# Patient Record
Sex: FEMALE | Race: WHITE | NOT HISPANIC OR LATINO
[De-identification: names, ages, dates, MRNs, and addresses within clinical notes are randomized per-mention and may not be internally consistent; named-entity substitution may affect disease eponyms.]

---

## 2017-01-23 PROBLEM — Z00.00 ENCOUNTER FOR PREVENTIVE HEALTH EXAMINATION: Status: ACTIVE | Noted: 2017-01-23

## 2017-02-24 ENCOUNTER — APPOINTMENT (OUTPATIENT)
Dept: CARDIOLOGY | Facility: CLINIC | Age: 72
End: 2017-02-24

## 2017-07-03 ENCOUNTER — APPOINTMENT (OUTPATIENT)
Dept: CARDIOLOGY | Facility: CLINIC | Age: 72
End: 2017-07-03

## 2017-07-10 ENCOUNTER — APPOINTMENT (OUTPATIENT)
Dept: CARDIOLOGY | Facility: CLINIC | Age: 72
End: 2017-07-10
Payer: COMMERCIAL

## 2017-07-10 PROCEDURE — 85610 PROTHROMBIN TIME: CPT | Mod: QW

## 2017-07-10 PROCEDURE — 99214 OFFICE O/P EST MOD 30 MIN: CPT

## 2017-07-10 PROCEDURE — 93306 TTE W/DOPPLER COMPLETE: CPT

## 2017-07-12 PROCEDURE — 93224 XTRNL ECG REC UP TO 48 HRS: CPT

## 2017-07-18 ENCOUNTER — APPOINTMENT (OUTPATIENT)
Dept: CARDIOLOGY | Facility: CLINIC | Age: 72
End: 2017-07-18
Payer: COMMERCIAL

## 2017-07-18 PROCEDURE — 99214 OFFICE O/P EST MOD 30 MIN: CPT

## 2017-08-15 ENCOUNTER — APPOINTMENT (OUTPATIENT)
Dept: CARDIOLOGY | Facility: CLINIC | Age: 72
End: 2017-08-15
Payer: COMMERCIAL

## 2017-08-15 PROCEDURE — 85610 PROTHROMBIN TIME: CPT | Mod: QW

## 2017-08-15 PROCEDURE — 99214 OFFICE O/P EST MOD 30 MIN: CPT

## 2017-08-21 ENCOUNTER — APPOINTMENT (OUTPATIENT)
Dept: CARDIOLOGY | Facility: CLINIC | Age: 72
End: 2017-08-21
Payer: COMMERCIAL

## 2017-08-21 PROCEDURE — 85610 PROTHROMBIN TIME: CPT | Mod: QW

## 2017-08-25 ENCOUNTER — APPOINTMENT (OUTPATIENT)
Dept: CARDIOLOGY | Facility: CLINIC | Age: 72
End: 2017-08-25
Payer: COMMERCIAL

## 2017-08-25 PROCEDURE — 85610 PROTHROMBIN TIME: CPT | Mod: QW

## 2017-09-01 ENCOUNTER — APPOINTMENT (OUTPATIENT)
Dept: CARDIOLOGY | Facility: CLINIC | Age: 72
End: 2017-09-01
Payer: COMMERCIAL

## 2017-09-01 PROCEDURE — 85610 PROTHROMBIN TIME: CPT | Mod: QW

## 2017-09-08 ENCOUNTER — APPOINTMENT (OUTPATIENT)
Dept: CARDIOLOGY | Facility: CLINIC | Age: 72
End: 2017-09-08
Payer: MEDICARE

## 2017-09-08 PROCEDURE — 85610 PROTHROMBIN TIME: CPT | Mod: QW

## 2017-09-15 ENCOUNTER — APPOINTMENT (OUTPATIENT)
Dept: CARDIOLOGY | Facility: CLINIC | Age: 72
End: 2017-09-15
Payer: MEDICARE

## 2017-09-15 PROCEDURE — 85610 PROTHROMBIN TIME: CPT | Mod: QW

## 2017-09-22 ENCOUNTER — APPOINTMENT (OUTPATIENT)
Dept: CARDIOLOGY | Facility: CLINIC | Age: 72
End: 2017-09-22
Payer: MEDICARE

## 2017-09-22 PROCEDURE — 85610 PROTHROMBIN TIME: CPT | Mod: QW

## 2017-09-29 ENCOUNTER — APPOINTMENT (OUTPATIENT)
Dept: CARDIOLOGY | Facility: CLINIC | Age: 72
End: 2017-09-29
Payer: MEDICARE

## 2017-09-29 PROCEDURE — 85610 PROTHROMBIN TIME: CPT | Mod: QW

## 2017-10-09 ENCOUNTER — APPOINTMENT (OUTPATIENT)
Dept: CARDIOLOGY | Facility: CLINIC | Age: 72
End: 2017-10-09
Payer: COMMERCIAL

## 2017-10-09 PROCEDURE — 85610 PROTHROMBIN TIME: CPT | Mod: QW

## 2017-10-19 ENCOUNTER — APPOINTMENT (OUTPATIENT)
Dept: CARDIOLOGY | Facility: CLINIC | Age: 72
End: 2017-10-19
Payer: COMMERCIAL

## 2017-10-19 PROCEDURE — 99214 OFFICE O/P EST MOD 30 MIN: CPT | Mod: 25

## 2017-10-19 PROCEDURE — 93291 INTERROG DEV EVAL SCRMS IP: CPT

## 2017-10-20 ENCOUNTER — APPOINTMENT (OUTPATIENT)
Dept: CARDIOLOGY | Facility: CLINIC | Age: 72
End: 2017-10-20
Payer: COMMERCIAL

## 2017-10-20 PROCEDURE — 85610 PROTHROMBIN TIME: CPT | Mod: QW

## 2017-11-03 ENCOUNTER — APPOINTMENT (OUTPATIENT)
Dept: CARDIOLOGY | Facility: CLINIC | Age: 72
End: 2017-11-03

## 2017-11-03 ENCOUNTER — APPOINTMENT (OUTPATIENT)
Dept: CARDIOLOGY | Facility: CLINIC | Age: 72
End: 2017-11-03
Payer: COMMERCIAL

## 2017-11-03 PROCEDURE — 85610 PROTHROMBIN TIME: CPT | Mod: QW

## 2017-11-08 ENCOUNTER — RECORD ABSTRACTING (OUTPATIENT)
Age: 72
End: 2017-11-08

## 2017-11-08 DIAGNOSIS — Z86.19 PERSONAL HISTORY OF OTHER INFECTIOUS AND PARASITIC DISEASES: ICD-10-CM

## 2017-11-08 DIAGNOSIS — Z87.19 PERSONAL HISTORY OF OTHER DISEASES OF THE DIGESTIVE SYSTEM: ICD-10-CM

## 2017-11-08 DIAGNOSIS — Z87.898 PERSONAL HISTORY OF OTHER SPECIFIED CONDITIONS: ICD-10-CM

## 2017-11-08 DIAGNOSIS — Z87.39 PERSONAL HISTORY OF OTHER DISEASES OF THE MUSCULOSKELETAL SYSTEM AND CONNECTIVE TISSUE: ICD-10-CM

## 2017-11-08 DIAGNOSIS — Z86.39 PERSONAL HISTORY OF OTHER ENDOCRINE, NUTRITIONAL AND METABOLIC DISEASE: ICD-10-CM

## 2017-11-08 DIAGNOSIS — Z82.49 FAMILY HISTORY OF ISCHEMIC HEART DISEASE AND OTHER DISEASES OF THE CIRCULATORY SYSTEM: ICD-10-CM

## 2017-11-08 DIAGNOSIS — Z78.9 OTHER SPECIFIED HEALTH STATUS: ICD-10-CM

## 2017-11-09 ENCOUNTER — APPOINTMENT (OUTPATIENT)
Dept: CARDIOLOGY | Facility: CLINIC | Age: 72
End: 2017-11-09
Payer: COMMERCIAL

## 2017-11-09 VITALS
WEIGHT: 146 LBS | OXYGEN SATURATION: 96 % | SYSTOLIC BLOOD PRESSURE: 130 MMHG | BODY MASS INDEX: 24.32 KG/M2 | HEART RATE: 82 BPM | HEIGHT: 65 IN | DIASTOLIC BLOOD PRESSURE: 74 MMHG

## 2017-11-09 DIAGNOSIS — J45.909 UNSPECIFIED ASTHMA, UNCOMPLICATED: ICD-10-CM

## 2017-11-09 DIAGNOSIS — M41.9 SCOLIOSIS, UNSPECIFIED: ICD-10-CM

## 2017-11-09 LAB — INR PPP: 1.7 RATIO

## 2017-11-09 PROCEDURE — 85610 PROTHROMBIN TIME: CPT | Mod: QW

## 2017-11-09 PROCEDURE — 99214 OFFICE O/P EST MOD 30 MIN: CPT

## 2017-11-09 RX ORDER — SUCRALFATE 1 G/1
1 TABLET ORAL
Qty: 360 | Refills: 0 | Status: DISCONTINUED | COMMUNITY
Start: 2017-09-11

## 2017-11-09 RX ORDER — CLOBETASOL PROPIONATE 0.5 MG/G
0.05 OINTMENT TOPICAL
Qty: 90 | Refills: 0 | Status: DISCONTINUED | COMMUNITY
Start: 2017-08-01

## 2017-11-09 RX ORDER — HYDROCODONE BITARTRATE AND ACETAMINOPHEN 5; 300 MG/1; MG/1
5-300 TABLET ORAL
Qty: 10 | Refills: 0 | Status: DISCONTINUED | COMMUNITY
Start: 2017-06-14

## 2017-11-09 RX ORDER — AMOXICILLIN AND CLAVULANATE POTASSIUM 500; 125 MG/1; MG/1
500-125 TABLET, FILM COATED ORAL
Qty: 30 | Refills: 0 | Status: DISCONTINUED | COMMUNITY
Start: 2017-06-14

## 2017-11-09 RX ORDER — DILTIAZEM HYDROCHLORIDE 120 MG/1
120 CAPSULE, EXTENDED RELEASE ORAL
Refills: 0 | Status: DISCONTINUED | COMMUNITY
End: 2017-11-09

## 2017-11-09 RX ORDER — CEPHALEXIN 500 MG/1
500 CAPSULE ORAL
Qty: 14 | Refills: 0 | Status: DISCONTINUED | COMMUNITY
Start: 2017-07-08

## 2017-11-09 RX ORDER — DIGOXIN 125 UG/1
125 TABLET ORAL DAILY
Refills: 0 | Status: DISCONTINUED | COMMUNITY
End: 2017-11-09

## 2017-11-09 RX ORDER — KETOCONAZOLE 20 MG/G
2 CREAM TOPICAL
Qty: 60 | Refills: 0 | Status: DISCONTINUED | COMMUNITY
Start: 2017-06-26

## 2017-11-09 RX ORDER — METOPROLOL SUCCINATE 25 MG/1
25 TABLET, EXTENDED RELEASE ORAL
Qty: 30 | Refills: 0 | Status: DISCONTINUED | COMMUNITY
Start: 2017-06-30

## 2017-11-09 RX ORDER — AMOXICILLIN 500 MG/1
500 CAPSULE ORAL
Qty: 21 | Refills: 0 | Status: DISCONTINUED | COMMUNITY
Start: 2017-06-12

## 2017-11-09 RX ORDER — APIXABAN 5 MG/1
5 TABLET, FILM COATED ORAL
Qty: 60 | Refills: 0 | Status: DISCONTINUED | COMMUNITY
Start: 2017-06-30

## 2017-11-09 RX ORDER — DILTIAZEM HYDROCHLORIDE 120 MG/1
120 CAPSULE, EXTENDED RELEASE ORAL
Qty: 60 | Refills: 0 | Status: DISCONTINUED | COMMUNITY
Start: 2017-07-08

## 2017-11-20 ENCOUNTER — APPOINTMENT (OUTPATIENT)
Dept: CARDIOLOGY | Facility: CLINIC | Age: 72
End: 2017-11-20
Payer: COMMERCIAL

## 2017-11-20 VITALS
HEART RATE: 78 BPM | BODY MASS INDEX: 26.31 KG/M2 | WEIGHT: 143 LBS | HEIGHT: 62 IN | OXYGEN SATURATION: 99 % | DIASTOLIC BLOOD PRESSURE: 90 MMHG | SYSTOLIC BLOOD PRESSURE: 130 MMHG

## 2017-11-20 PROCEDURE — 99212 OFFICE O/P EST SF 10 MIN: CPT

## 2017-11-28 ENCOUNTER — APPOINTMENT (OUTPATIENT)
Dept: CARDIOLOGY | Facility: CLINIC | Age: 72
End: 2017-11-28
Payer: COMMERCIAL

## 2017-11-28 PROCEDURE — 93298 REM INTERROG DEV EVAL SCRMS: CPT

## 2017-11-28 PROCEDURE — 93299: CPT

## 2017-12-01 ENCOUNTER — RX RENEWAL (OUTPATIENT)
Age: 72
End: 2017-12-01

## 2018-01-05 ENCOUNTER — APPOINTMENT (OUTPATIENT)
Dept: CARDIOLOGY | Facility: CLINIC | Age: 73
End: 2018-01-05
Payer: COMMERCIAL

## 2018-01-05 PROCEDURE — 93298 REM INTERROG DEV EVAL SCRMS: CPT

## 2018-01-05 PROCEDURE — 93299: CPT

## 2018-01-16 ENCOUNTER — APPOINTMENT (OUTPATIENT)
Dept: CARDIOLOGY | Facility: CLINIC | Age: 73
End: 2018-01-16
Payer: COMMERCIAL

## 2018-01-16 VITALS
SYSTOLIC BLOOD PRESSURE: 116 MMHG | WEIGHT: 150 LBS | DIASTOLIC BLOOD PRESSURE: 80 MMHG | HEIGHT: 62 IN | BODY MASS INDEX: 27.6 KG/M2 | HEART RATE: 72 BPM

## 2018-01-16 PROCEDURE — 99214 OFFICE O/P EST MOD 30 MIN: CPT

## 2018-01-16 RX ORDER — RIVAROXABAN 20 MG/1
20 TABLET, FILM COATED ORAL DAILY
Qty: 90 | Refills: 1 | Status: DISCONTINUED | COMMUNITY
Start: 2017-11-09 | End: 2018-01-16

## 2018-01-16 RX ORDER — HYDROXYZINE PAMOATE 25 MG/1
25 CAPSULE ORAL
Refills: 0 | Status: DISCONTINUED | COMMUNITY
End: 2018-01-16

## 2018-01-16 RX ORDER — VORTIOXETINE 20 MG/1
20 TABLET, FILM COATED ORAL
Refills: 0 | Status: DISCONTINUED | COMMUNITY
End: 2018-01-16

## 2018-01-16 RX ORDER — WARFARIN 7.5 MG/1
7.5 TABLET ORAL DAILY
Qty: 135 | Refills: 0 | Status: DISCONTINUED | COMMUNITY
Start: 2017-11-09 | End: 2018-01-16

## 2018-01-16 RX ORDER — WARFARIN 7.5 MG/1
7.5 TABLET ORAL
Refills: 0 | Status: DISCONTINUED | COMMUNITY
End: 2018-01-16

## 2018-01-16 RX ORDER — ALPRAZOLAM 0.25 MG/1
0.25 TABLET ORAL
Refills: 0 | Status: DISCONTINUED | COMMUNITY
End: 2018-01-16

## 2018-01-25 ENCOUNTER — MOBILE ON CALL (OUTPATIENT)
Age: 73
End: 2018-01-25

## 2018-01-30 ENCOUNTER — APPOINTMENT (OUTPATIENT)
Dept: CARDIOLOGY | Facility: CLINIC | Age: 73
End: 2018-01-30
Payer: COMMERCIAL

## 2018-01-30 PROCEDURE — 93015 CV STRESS TEST SUPVJ I&R: CPT

## 2018-01-30 PROCEDURE — 78452 HT MUSCLE IMAGE SPECT MULT: CPT

## 2018-01-30 PROCEDURE — A9502: CPT

## 2018-01-30 PROCEDURE — 93306 TTE W/DOPPLER COMPLETE: CPT

## 2018-02-09 ENCOUNTER — APPOINTMENT (OUTPATIENT)
Dept: CARDIOLOGY | Facility: CLINIC | Age: 73
End: 2018-02-09
Payer: COMMERCIAL

## 2018-02-09 PROCEDURE — 93298 REM INTERROG DEV EVAL SCRMS: CPT

## 2018-02-09 PROCEDURE — 93299: CPT

## 2018-02-16 ENCOUNTER — APPOINTMENT (OUTPATIENT)
Dept: CARDIOLOGY | Facility: CLINIC | Age: 73
End: 2018-02-16
Payer: COMMERCIAL

## 2018-02-16 VITALS
HEART RATE: 72 BPM | DIASTOLIC BLOOD PRESSURE: 84 MMHG | SYSTOLIC BLOOD PRESSURE: 130 MMHG | HEIGHT: 62 IN | WEIGHT: 149 LBS | BODY MASS INDEX: 27.42 KG/M2

## 2018-02-16 PROCEDURE — 99214 OFFICE O/P EST MOD 30 MIN: CPT

## 2018-02-16 RX ORDER — CLONAZEPAM 1 MG/1
1 TABLET ORAL
Qty: 30 | Refills: 0 | Status: DISCONTINUED | COMMUNITY
Start: 2017-09-17 | End: 2018-02-16

## 2018-03-06 ENCOUNTER — CLINICAL ADVICE (OUTPATIENT)
Age: 73
End: 2018-03-06

## 2018-03-16 ENCOUNTER — APPOINTMENT (OUTPATIENT)
Dept: CARDIOLOGY | Facility: CLINIC | Age: 73
End: 2018-03-16
Payer: COMMERCIAL

## 2018-03-16 PROCEDURE — 93298 REM INTERROG DEV EVAL SCRMS: CPT

## 2018-03-16 PROCEDURE — 93299: CPT

## 2018-03-29 ENCOUNTER — APPOINTMENT (OUTPATIENT)
Dept: CARDIOLOGY | Facility: CLINIC | Age: 73
End: 2018-03-29
Payer: COMMERCIAL

## 2018-03-29 VITALS
DIASTOLIC BLOOD PRESSURE: 64 MMHG | HEART RATE: 82 BPM | WEIGHT: 152 LBS | HEIGHT: 62 IN | BODY MASS INDEX: 27.97 KG/M2 | SYSTOLIC BLOOD PRESSURE: 128 MMHG

## 2018-03-29 PROCEDURE — 99214 OFFICE O/P EST MOD 30 MIN: CPT

## 2018-03-29 RX ORDER — ASPIRIN ENTERIC COATED TABLETS 81 MG 81 MG/1
81 TABLET, DELAYED RELEASE ORAL DAILY
Refills: 0 | Status: DISCONTINUED | COMMUNITY
End: 2018-03-29

## 2018-04-24 ENCOUNTER — NON-APPOINTMENT (OUTPATIENT)
Age: 73
End: 2018-04-24

## 2018-04-24 ENCOUNTER — APPOINTMENT (OUTPATIENT)
Dept: CARDIOLOGY | Facility: CLINIC | Age: 73
End: 2018-04-24

## 2018-04-24 ENCOUNTER — APPOINTMENT (OUTPATIENT)
Dept: CARDIOLOGY | Facility: CLINIC | Age: 73
End: 2018-04-24
Payer: COMMERCIAL

## 2018-04-24 VITALS
BODY MASS INDEX: 28.16 KG/M2 | HEIGHT: 62 IN | HEART RATE: 90 BPM | WEIGHT: 153 LBS | SYSTOLIC BLOOD PRESSURE: 128 MMHG | DIASTOLIC BLOOD PRESSURE: 64 MMHG

## 2018-04-24 PROCEDURE — 93000 ELECTROCARDIOGRAM COMPLETE: CPT | Mod: XP

## 2018-04-24 PROCEDURE — 93298 REM INTERROG DEV EVAL SCRMS: CPT

## 2018-04-24 PROCEDURE — 93299: CPT

## 2018-04-24 PROCEDURE — 99214 OFFICE O/P EST MOD 30 MIN: CPT

## 2018-05-03 ENCOUNTER — OUTPATIENT (OUTPATIENT)
Dept: OUTPATIENT SERVICES | Facility: HOSPITAL | Age: 73
LOS: 1 days | End: 2018-05-03

## 2018-06-01 ENCOUNTER — APPOINTMENT (OUTPATIENT)
Dept: CARDIOLOGY | Facility: CLINIC | Age: 73
End: 2018-06-01
Payer: COMMERCIAL

## 2018-06-01 PROCEDURE — 93299: CPT

## 2018-06-01 PROCEDURE — 93298 REM INTERROG DEV EVAL SCRMS: CPT

## 2018-06-15 ENCOUNTER — RX RENEWAL (OUTPATIENT)
Age: 73
End: 2018-06-15

## 2018-06-18 ENCOUNTER — APPOINTMENT (OUTPATIENT)
Dept: CARDIOLOGY | Facility: CLINIC | Age: 73
End: 2018-06-18
Payer: COMMERCIAL

## 2018-06-18 VITALS
BODY MASS INDEX: 26.68 KG/M2 | HEIGHT: 62 IN | DIASTOLIC BLOOD PRESSURE: 64 MMHG | HEART RATE: 93 BPM | SYSTOLIC BLOOD PRESSURE: 118 MMHG | WEIGHT: 145 LBS

## 2018-06-18 PROCEDURE — 99214 OFFICE O/P EST MOD 30 MIN: CPT

## 2018-06-18 PROCEDURE — 93000 ELECTROCARDIOGRAM COMPLETE: CPT

## 2018-06-18 RX ORDER — DILTIAZEM HYDROCHLORIDE 120 MG/1
120 CAPSULE, EXTENDED RELEASE ORAL
Qty: 180 | Refills: 3 | Status: DISCONTINUED | COMMUNITY
Start: 2017-07-31 | End: 2018-06-18

## 2018-06-18 RX ORDER — MOMETASONE FUROATE 220 UG/1
220 INHALANT RESPIRATORY (INHALATION) TWICE DAILY
Refills: 0 | Status: DISCONTINUED | COMMUNITY
End: 2018-06-18

## 2018-06-18 RX ORDER — CLONAZEPAM 0.5 MG/1
0.5 TABLET ORAL
Refills: 0 | Status: DISCONTINUED | COMMUNITY
End: 2018-06-18

## 2018-06-26 ENCOUNTER — OUTPATIENT (OUTPATIENT)
Dept: OUTPATIENT SERVICES | Facility: HOSPITAL | Age: 73
LOS: 1 days | End: 2018-06-26

## 2018-07-06 ENCOUNTER — APPOINTMENT (OUTPATIENT)
Dept: CARDIOLOGY | Facility: CLINIC | Age: 73
End: 2018-07-06
Payer: COMMERCIAL

## 2018-07-06 PROCEDURE — 93299: CPT

## 2018-07-06 PROCEDURE — 93298 REM INTERROG DEV EVAL SCRMS: CPT

## 2018-08-15 ENCOUNTER — APPOINTMENT (OUTPATIENT)
Dept: CARDIOLOGY | Facility: CLINIC | Age: 73
End: 2018-08-15
Payer: COMMERCIAL

## 2018-08-15 PROCEDURE — 93298 REM INTERROG DEV EVAL SCRMS: CPT

## 2018-08-15 PROCEDURE — 93299: CPT

## 2018-09-21 ENCOUNTER — APPOINTMENT (OUTPATIENT)
Dept: CARDIOLOGY | Facility: CLINIC | Age: 73
End: 2018-09-21
Payer: COMMERCIAL

## 2018-09-21 PROCEDURE — 93298 REM INTERROG DEV EVAL SCRMS: CPT

## 2018-09-21 PROCEDURE — 93299: CPT

## 2018-09-25 ENCOUNTER — APPOINTMENT (OUTPATIENT)
Dept: CARDIOLOGY | Facility: CLINIC | Age: 73
End: 2018-09-25
Payer: COMMERCIAL

## 2018-09-25 ENCOUNTER — RECORD ABSTRACTING (OUTPATIENT)
Age: 73
End: 2018-09-25

## 2018-09-25 VITALS
HEIGHT: 62 IN | OXYGEN SATURATION: 97 % | BODY MASS INDEX: 27.97 KG/M2 | DIASTOLIC BLOOD PRESSURE: 70 MMHG | HEART RATE: 78 BPM | WEIGHT: 152 LBS | SYSTOLIC BLOOD PRESSURE: 134 MMHG

## 2018-09-25 DIAGNOSIS — Z92.29 PERSONAL HISTORY OF OTHER DRUG THERAPY: ICD-10-CM

## 2018-09-25 DIAGNOSIS — Z01.810 ENCOUNTER FOR PREPROCEDURAL CARDIOVASCULAR EXAMINATION: ICD-10-CM

## 2018-09-25 PROCEDURE — 99214 OFFICE O/P EST MOD 30 MIN: CPT

## 2018-09-25 RX ORDER — LEVALBUTEROL TARTRATE 45 UG/1
45 AEROSOL, METERED ORAL
Qty: 45 | Refills: 0 | Status: DISCONTINUED | COMMUNITY
Start: 2017-11-01 | End: 2018-09-25

## 2018-09-25 RX ORDER — SUCRALFATE 1 G/10ML
1 SUSPENSION ORAL TWICE DAILY
Refills: 0 | Status: DISCONTINUED | COMMUNITY
End: 2018-09-25

## 2018-09-25 RX ORDER — ALENDRONATE SODIUM 70 MG/1
70 TABLET ORAL
Refills: 0 | Status: DISCONTINUED | COMMUNITY
End: 2018-09-25

## 2018-09-26 ENCOUNTER — RX CHANGE (OUTPATIENT)
Age: 73
End: 2018-09-26

## 2018-09-26 RX ORDER — DILTIAZEM HYDROCHLORIDE 120 MG/1
120 CAPSULE, COATED, EXTENDED RELEASE ORAL DAILY
Qty: 30 | Refills: 1 | Status: DISCONTINUED | COMMUNITY
Start: 2018-02-16 | End: 2018-09-26

## 2018-10-04 ENCOUNTER — OUTPATIENT (OUTPATIENT)
Dept: OUTPATIENT SERVICES | Facility: HOSPITAL | Age: 73
LOS: 1 days | End: 2018-10-04

## 2018-10-26 ENCOUNTER — APPOINTMENT (OUTPATIENT)
Dept: CARDIOLOGY | Facility: CLINIC | Age: 73
End: 2018-10-26
Payer: COMMERCIAL

## 2018-10-26 PROCEDURE — 93299: CPT

## 2018-10-26 PROCEDURE — 93298 REM INTERROG DEV EVAL SCRMS: CPT

## 2018-11-30 ENCOUNTER — APPOINTMENT (OUTPATIENT)
Dept: CARDIOLOGY | Facility: CLINIC | Age: 73
End: 2018-11-30
Payer: COMMERCIAL

## 2018-11-30 PROCEDURE — 93299: CPT

## 2018-11-30 PROCEDURE — 93298 REM INTERROG DEV EVAL SCRMS: CPT

## 2018-11-30 NOTE — ASSESSMENT
[FreeTextEntry1] : Remote Lnq Summary\par \par 11-30-18 \par Viewed lnq summary 8-21-18 to 9-21-18. \par No events were noted\par  Next summary on PA schedule 32days--cv\par

## 2019-01-04 ENCOUNTER — APPOINTMENT (OUTPATIENT)
Dept: CARDIOLOGY | Facility: CLINIC | Age: 74
End: 2019-01-04
Payer: MEDICARE

## 2019-01-04 PROCEDURE — 93299: CPT

## 2019-01-04 PROCEDURE — 93298 REM INTERROG DEV EVAL SCRMS: CPT

## 2019-01-04 NOTE — ASSESSMENT
[FreeTextEntry1] : Remote Lnq Summary\par \par 1-4-19 \par Viewed lnq summary 9-21-18 to 10-22-18. \par No events noted.\par  Next summary on PA schedule 32d--cv\par

## 2019-01-29 ENCOUNTER — APPOINTMENT (OUTPATIENT)
Dept: CARDIOLOGY | Facility: CLINIC | Age: 74
End: 2019-01-29
Payer: MEDICARE

## 2019-01-29 VITALS
HEIGHT: 62 IN | OXYGEN SATURATION: 98 % | HEART RATE: 74 BPM | DIASTOLIC BLOOD PRESSURE: 68 MMHG | BODY MASS INDEX: 29.26 KG/M2 | WEIGHT: 159 LBS | SYSTOLIC BLOOD PRESSURE: 122 MMHG

## 2019-01-29 PROCEDURE — 99214 OFFICE O/P EST MOD 30 MIN: CPT

## 2019-01-29 RX ORDER — CLOTRIMAZOLE AND BETAMETHASONE DIPROPIONATE 10; .5 MG/G; MG/G
1-0.05 CREAM TOPICAL
Refills: 0 | Status: ACTIVE | COMMUNITY

## 2019-01-29 RX ORDER — CHROMIUM 200 MCG
TABLET ORAL
Refills: 0 | Status: ACTIVE | COMMUNITY

## 2019-01-29 NOTE — PHYSICAL EXAM
[General Appearance - Well Developed] : well developed [Normal Appearance] : normal appearance [Well Groomed] : well groomed [General Appearance - Well Nourished] : well nourished [No Deformities] : no deformities [General Appearance - In No Acute Distress] : no acute distress [Respiration, Rhythm And Depth] : normal respiratory rhythm and effort [Auscultation Breath Sounds / Voice Sounds] : lungs were clear to auscultation bilaterally [Heart Rate And Rhythm] : heart rate and rhythm were normal [Heart Sounds] : normal S1 and S2 [Edema] : no peripheral edema present [Abdomen Soft] : soft [Abdomen Tenderness] : non-tender [] : no hepato-splenomegaly [Abdomen Mass (___ Cm)] : no abdominal mass palpated [Abnormal Walk] : normal gait [FreeTextEntry1] : kyphoscoliosis [Nail Clubbing] : no clubbing of the fingernails [Cyanosis, Localized] : no localized cyanosis [Skin Color & Pigmentation] : normal skin color and pigmentation [Oriented To Time, Place, And Person] : oriented to person, place, and time [Impaired Insight] : insight and judgment were intact [Affect] : the affect was normal

## 2019-01-29 NOTE — REASON FOR VISIT
[Follow-Up - Clinic] : a clinic follow-up of [Atrial Fibrillation] : atrial fibrillation [Coronary Artery Disease] : coronary artery disease [Dyspnea] : dyspnea [Hypertension] : hypertension [Supraventricular Tachycardia] : supraventricular tachycardia [FreeTextEntry1] : 73-year-old comes in for followup consultation. Stable dyspnea on exertion. A mild to moderate level of workload. No PND, orthopnea, pedal edema. No cough, fever, or chills. Often related to anxiety. It is nonprogressive.\par She denies any chest pain, palpitation, dizziness, or syncopal episode.\par Continue to have anxiety as before.\par No recent hospital admission.\par No bleeding complications. P.\par

## 2019-01-29 NOTE — DISCUSSION/SUMMARY
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 Aspirin 81 mg to be taken every other day due to increased ecchymosis and easy bruising. After taking it daily. It is important in presence of coronary aneurysm. Continue diltiazem. Continue to monitor thru [facility].\par \par Hypertension, blood pressure controlled. Continue current medication. Low sodium diet. \par \par Hyperlipidemia, continue atorvastatin. Lifestyle and risk factor modification.\par \par Shortness of breath,Pulmonary nodules. Continue management as per pulmonologist, who she is following a regular basis.\par Echocardiogram also will be done to followup on an ejection fraction wall motion. Pulmonary pressures and severity of mitral regurgitation\par \par Fasting lipid panel, and CMP has been ordered\par \par Left anterior descending artery aneurysm. No significant ischemia. On aspirin as tolerated along with atorvastatin.\par \par Counseling regarding low saturated fat, low carbohydrate intake was reviewed. Active lifestyle and regular. Exercise is along with weight maintenance is advised.\par \par Counseling regarding low salt diet, active lifestyle, regular exercise, and weight maintenance was reviewed.\par \par Red flag symptoms which would warrant sooner emergent evaluation reviewed with the patient. \par Questions and concerns were addressed and answered.\par \par \par \par

## 2019-01-29 NOTE — ASSESSMENT
[FreeTextEntry1] : \par Labs March 20, 2018. CBC was stable. Creatinine 2.68 sodium 141, potassium 3.9, TSH 3.3. pro BNP 98.\par Echocardiogram 1/30/18 EF 60%\par Nuclear stress test 1/30/18 No evidence of ischemia\par \par \par \par \par \par \par

## 2019-01-29 NOTE — HISTORY OF PRESENT ILLNESS
[FreeTextEntry1] : Active medical problem\par \par  history of hypertension without heart failure, renal insufficiency, hyperlipidemia, kyphoscoliosis, pSVT versus pAF status post AVNRT ablation, s/p ILR implantation.\par History of breast nodule, status post ultrasound.\par Lung nodule being followed by pulmonologist in [place].\par LAD aneurysmal\par \par \par

## 2019-01-29 NOTE — REVIEW OF SYSTEMS
[see HPI] : see HPI [Shortness Of Breath] : shortness of breath [Dyspnea on exertion] : dyspnea during exertion [Chest  Pressure] : no chest pressure [Chest Pain] : no chest pain [Leg Claudication] : no intermittent leg claudication [Palpitations] : no palpitations [Wheezing] : no wheezing [Joint Pain] : joint pain [Confusion] : no confusion was observed [Memory Lapses Or Loss] : no memory lapses or loss [Depression] : no depression [Anxiety] : anxiety [Under Stress] : not under stress [Suicidal] : not suicidal [Negative] : Heme/Lymph

## 2019-01-29 NOTE — CARDIOLOGY SUMMARY
[No Ischemia] : no Ischemia [No Exercise Ind Arr] : no exercise induced arrhythmias [No Symptoms] : no Symptoms [LVEF ___%] : LVEF [unfilled]% [___] : [unfilled] [Mild] : mild LV dysfunction [None] : no pulmonary hypertension [Normal] : normal LA size

## 2019-02-08 ENCOUNTER — APPOINTMENT (OUTPATIENT)
Dept: CARDIOLOGY | Facility: CLINIC | Age: 74
End: 2019-02-08
Payer: MEDICARE

## 2019-02-08 PROCEDURE — 93299: CPT

## 2019-02-08 PROCEDURE — 93298 REM INTERROG DEV EVAL SCRMS: CPT

## 2019-02-08 NOTE — ASSESSMENT
[FreeTextEntry1] : Remote LNq Summary\par \par 2-8-19\par Viewed lnq summary 10-22-18 to 11-21-18. \par No events were noted. \par Next summary on PA schedule 32days--cv

## 2019-02-12 ENCOUNTER — APPOINTMENT (OUTPATIENT)
Dept: CARDIOLOGY | Facility: CLINIC | Age: 74
End: 2019-02-12
Payer: MEDICARE

## 2019-02-12 PROCEDURE — 93306 TTE W/DOPPLER COMPLETE: CPT

## 2019-02-14 ENCOUNTER — APPOINTMENT (OUTPATIENT)
Dept: CARDIOLOGY | Facility: CLINIC | Age: 74
End: 2019-02-14

## 2019-03-15 ENCOUNTER — APPOINTMENT (OUTPATIENT)
Dept: CARDIOLOGY | Facility: CLINIC | Age: 74
End: 2019-03-15
Payer: MEDICARE

## 2019-03-15 PROCEDURE — 93299: CPT

## 2019-03-15 PROCEDURE — 93298 REM INTERROG DEV EVAL SCRMS: CPT

## 2019-03-15 NOTE — ASSESSMENT
[FreeTextEntry1] : Remote Lnq Summary\par \par 3-15-19 \par Viewed lnq summary 11-21-18 to 12-12-18. \par No events were noted.\par  Next summary on PA schedule 32days--cv

## 2019-04-19 ENCOUNTER — APPOINTMENT (OUTPATIENT)
Dept: CARDIOLOGY | Facility: CLINIC | Age: 74
End: 2019-04-19
Payer: MEDICARE

## 2019-04-19 PROCEDURE — 93299: CPT

## 2019-04-19 PROCEDURE — 93298 REM INTERROG DEV EVAL SCRMS: CPT

## 2019-04-19 NOTE — ASSESSMENT
[FreeTextEntry1] : Remote LNQ Summary\par \par 4-19-19\par Viewed lnq summary 12-12-18 to 1-23-19. \par No events were noted.\par  Next summary on PA schedule 32days--cv

## 2019-05-16 ENCOUNTER — APPOINTMENT (OUTPATIENT)
Dept: CARDIOLOGY | Facility: CLINIC | Age: 74
End: 2019-05-16
Payer: MEDICARE

## 2019-05-16 VITALS
OXYGEN SATURATION: 97 % | WEIGHT: 163 LBS | HEART RATE: 83 BPM | DIASTOLIC BLOOD PRESSURE: 80 MMHG | SYSTOLIC BLOOD PRESSURE: 120 MMHG | HEIGHT: 62 IN | BODY MASS INDEX: 30 KG/M2

## 2019-05-16 DIAGNOSIS — R07.89 OTHER CHEST PAIN: ICD-10-CM

## 2019-05-16 PROCEDURE — 99214 OFFICE O/P EST MOD 30 MIN: CPT

## 2019-05-16 RX ORDER — DENOSUMAB 60 MG/ML
60 INJECTION SUBCUTANEOUS
Refills: 0 | Status: ACTIVE | COMMUNITY

## 2019-05-16 RX ORDER — MOMETASONE FUROATE 200 UG/1
200 AEROSOL RESPIRATORY (INHALATION) TWICE DAILY
Refills: 0 | Status: DISCONTINUED | COMMUNITY
Start: 2018-09-25 | End: 2019-05-16

## 2019-05-16 RX ORDER — LORAZEPAM 0.5 MG/1
0.5 TABLET ORAL TWICE DAILY
Refills: 0 | Status: ACTIVE | COMMUNITY
Start: 2018-09-25

## 2019-05-24 ENCOUNTER — APPOINTMENT (OUTPATIENT)
Dept: CARDIOLOGY | Facility: CLINIC | Age: 74
End: 2019-05-24
Payer: MEDICARE

## 2019-05-24 PROCEDURE — 93298 REM INTERROG DEV EVAL SCRMS: CPT

## 2019-05-24 PROCEDURE — 93299: CPT

## 2019-05-24 NOTE — ASSESSMENT
[FreeTextEntry1] : Remote Lnq Summary\par \par 5-24-19 \par Viewed lnq summary 1-23-19 to 2-23-19. No events were noted. \par Next summary on PA schedule 32days--cv

## 2019-05-30 ENCOUNTER — CLINICAL ADVICE (OUTPATIENT)
Age: 74
End: 2019-05-30

## 2019-06-03 RX ORDER — MULTIVITAMIN
TABLET ORAL
Refills: 0 | Status: COMPLETED | COMMUNITY
End: 2019-06-03

## 2019-06-03 RX ORDER — MIRTAZAPINE 45 MG/1
45 TABLET, ORALLY DISINTEGRATING ORAL
Refills: 0 | Status: ACTIVE | COMMUNITY
Start: 2018-09-25

## 2019-06-04 ENCOUNTER — APPOINTMENT (OUTPATIENT)
Dept: CARDIOLOGY | Facility: CLINIC | Age: 74
End: 2019-06-04
Payer: MEDICARE

## 2019-06-11 ENCOUNTER — RESULT REVIEW (OUTPATIENT)
Age: 74
End: 2019-06-11

## 2019-06-11 ENCOUNTER — APPOINTMENT (OUTPATIENT)
Dept: CARDIOLOGY | Facility: CLINIC | Age: 74
End: 2019-06-11
Payer: MEDICARE

## 2019-06-11 PROCEDURE — A9502: CPT

## 2019-06-11 PROCEDURE — 78452 HT MUSCLE IMAGE SPECT MULT: CPT

## 2019-06-11 PROCEDURE — 93015 CV STRESS TEST SUPVJ I&R: CPT

## 2019-06-11 NOTE — REASON FOR VISIT
[Follow-Up - From Hospitalization] : follow-up of a recent hospitalization for [Coronary Artery Disease] : coronary artery disease [Atrial Fibrillation] : atrial fibrillation [Hypertension] : hypertension [Dyspnea] : dyspnea [Supraventricular Tachycardia] : supraventricular tachycardia [FreeTextEntry1] : 73 year female comes in after recent hospitalization when she was admitted with complaint of chest pain. P.o. twice like sensation. Left-sided precordial region. In the morning. A significant period nonexertional. There was no associated nausea, vomiting. No recurrence subsequent to that. It resolved on its own. She was ruled out for microinfarction. EKG had shown normal sinus rhythm.\par Labs were stable.\par Chest x-ray did not reveal any acute cardiopulmonary process. She had a recent CT scan of the chest and abdomen without contrast showed aortic calcification. Hiatus hernia. Thyroid nodule. Aortic ectasia at 3.4 cm. Coronary and aortic calcification.\par She has continued dyspnea on exertion. A mild to moderate level of workload. No PND, orthopnea, pedal edema. No cough, fever, or chills. Often related to anxiety. It is nonprogressive.\par She denies any , palpitation, dizziness, or syncopal episode.\par Continue to have anxiety as before.\par No recent hospital admission.\par No bleeding complications. P.\par

## 2019-06-11 NOTE — REVIEW OF SYSTEMS
[Dyspnea on exertion] : dyspnea during exertion [see HPI] : see HPI [Shortness Of Breath] : shortness of breath [Joint Pain] : joint pain [Anxiety] : anxiety [Negative] : Heme/Lymph [Chest  Pressure] : no chest pressure [Chest Pain] : no chest pain [Leg Claudication] : no intermittent leg claudication [Wheezing] : no wheezing [Palpitations] : no palpitations [Confusion] : no confusion was observed [Memory Lapses Or Loss] : no memory lapses or loss [Under Stress] : not under stress [Depression] : no depression [Suicidal] : not suicidal

## 2019-06-11 NOTE — DISCUSSION/SUMMARY
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 No acute EKG changes. PA and presence of large hiatus hernia. Recent ischemic evaluation was negative and presence of coronary atherosclerosis, coronary aneurysm. She has stable vital signs.\par In no recurrent chest pain. If recurrent symptoms, we will pursue ischemic evaluation. If she planned to have surgical intervention of her large hiatus hernia. We will also consider non-invasive ischemic evaluation with nuclear myocardial perfusion scan. She will contact me.\par Her echocardiogram had shown an ejection fraction 65% in ascending aortic he 0.4 cm\par \par  implantable loop recorder for paroxysmal atrial fibrillation or atrial arrhythmia status post ablation. Aspirin 81 mg to be taken every other day due to increased ecchymosis and easy bruising. After taking it daily. It is important in presence of coronary aneurysm. Continue diltiazem. Continue to monitor thru [facility].\par \par Hypertension, blood pressure controlled. Continue current medication. Low sodium diet. \par \par Hyperlipidemia, continue atorvastatin. Lifestyle and risk factor modification.\par \par Shortness of breath,Pulmonary nodules. Continue management as per pulmonologist, who she is following a regular basis.\par \par Thyroid nodule. A CT scan. Followup with primary care physician. Incidental finding.\par \par Counseling regarding low saturated fat, salt and carbohydrate intake was reviewed. Active lifestyle and regular. Exercise along with weight management is advised.\par All the above were at length reviewed. Answered all the questions. Thank you very much for this kind referral. Please do not hesitate to give me a call for any question.\par Part of this transcription was done with voice recognition software and phonetically similar errors are common. I apologize for that. Please donot hesitate to call for any questions due to above.\par \par \par \par \par

## 2019-06-11 NOTE — ADDENDUM
[FreeTextEntry1] : Reviewed on June 11, 2019.\par Discussed with her. She has no changes in clinical status since last seen. No recurrent chest pain.\par Pharmacological myocardial perfusion scan June 11, 2019 96 in symptoms. Nonischemic EKG portion of the test. Nonischemic perfusion part. Preserved ejection fraction noted.\par EKG outside office normal sinus rhythm, low-voltage.\par Lab June 7, 2019  stable. CMP, stable CBC.\par Chest x-ray, June 7, 2019 no significant abnormality except tortuous aorta. Hiatus hernia noted\par \par Based on my evaluation on May 16, 2019 and subsequent test as noted above\par \par At present, there are no active cardiac conditions.\par No recent unstable coronary syndrome, decompensated heart failure, severe valvular heart disease or significant dysrhythmias.\par The clinical benefit of the proposed procedure outweighs the associated cardiovascular risk.\par Risk not attenuated with further cardiovascular testing.\par Prior testing as outlined above.\par Optimized from a cardiovascular perspective.\par Control blood pressure, heart rate, pulse oximetry perioperatively.\par If required appropriate intravenous medication for the outpatient oral medication for blood pressure, heart rate control.\par DVT prophylaxis as per indication.\par \par Call for questions.

## 2019-06-11 NOTE — PHYSICAL EXAM
[General Appearance - Well Developed] : well developed [Well Groomed] : well groomed [Normal Appearance] : normal appearance [No Deformities] : no deformities [General Appearance - Well Nourished] : well nourished [General Appearance - In No Acute Distress] : no acute distress [Respiration, Rhythm And Depth] : normal respiratory rhythm and effort [Heart Sounds] : normal S1 and S2 [Heart Rate And Rhythm] : heart rate and rhythm were normal [Auscultation Breath Sounds / Voice Sounds] : lungs were clear to auscultation bilaterally [Abdomen Soft] : soft [Edema] : no peripheral edema present [Abdomen Mass (___ Cm)] : no abdominal mass palpated [Abdomen Tenderness] : non-tender [] : no hepato-splenomegaly [Abnormal Walk] : normal gait [Nail Clubbing] : no clubbing of the fingernails [Skin Color & Pigmentation] : normal skin color and pigmentation [Oriented To Time, Place, And Person] : oriented to person, place, and time [Cyanosis, Localized] : no localized cyanosis [Impaired Insight] : insight and judgment were intact [Affect] : the affect was normal [FreeTextEntry1] : kyphoscoliosis

## 2019-06-11 NOTE — ASSESSMENT
[FreeTextEntry1] : \par Labs March 20, 2018. CBC was stable. Creatinine 2.68 sodium 141, potassium 3.9, TSH 3.3. pro BNP 98.\par Echocardiogram 1/30/18 EF 60%\par Nuclear stress test 1/30/18 No evidence of ischemia\par \par Reviewed on May 16, 2019.\par Hospital information reviewed, which includes evaluation nodes. Labs chest x-ray, EKG. CT scan of the chest and abdomen ordered by gastroenterologist\par \par \par \par \par \par

## 2019-06-12 ENCOUNTER — APPOINTMENT (OUTPATIENT)
Dept: CARDIOLOGY | Facility: CLINIC | Age: 74
End: 2019-06-12

## 2019-07-05 ENCOUNTER — APPOINTMENT (OUTPATIENT)
Dept: CARDIOLOGY | Facility: CLINIC | Age: 74
End: 2019-07-05
Payer: MEDICARE

## 2019-07-05 PROCEDURE — 93298 REM INTERROG DEV EVAL SCRMS: CPT

## 2019-07-05 PROCEDURE — 93299: CPT

## 2019-07-05 NOTE — ASSESSMENT
[FreeTextEntry1] : Remote LNq Summary\par \par 7-5-19 \par \par Viewed lnq summary 2-23-19 to 3-26-19 and 3-26-19 to 4-26-19. (to catch up on automatic cycle, approved by Denisa)\par No events were noted. \par Next summary on PA schedule 32days--cv

## 2019-08-09 ENCOUNTER — APPOINTMENT (OUTPATIENT)
Dept: CARDIOLOGY | Facility: CLINIC | Age: 74
End: 2019-08-09
Payer: MEDICARE

## 2019-08-09 PROCEDURE — 93299: CPT

## 2019-08-09 PROCEDURE — 93298 REM INTERROG DEV EVAL SCRMS: CPT

## 2019-08-14 ENCOUNTER — RX RENEWAL (OUTPATIENT)
Age: 74
End: 2019-08-14

## 2019-09-13 ENCOUNTER — APPOINTMENT (OUTPATIENT)
Dept: CARDIOLOGY | Facility: CLINIC | Age: 74
End: 2019-09-13
Payer: MEDICARE

## 2019-09-13 PROCEDURE — 93298 REM INTERROG DEV EVAL SCRMS: CPT

## 2019-09-13 PROCEDURE — 93299: CPT

## 2019-09-13 NOTE — ASSESSMENT
[FreeTextEntry1] : Remote LNq Summary\par \par 9-13-19 \par Viewed lnq summary 5-27-19 to 6-27-19.\par No events were noted. \par Next summary on pa schedule 32days-cv

## 2019-10-16 ENCOUNTER — APPOINTMENT (OUTPATIENT)
Dept: CARDIOLOGY | Facility: CLINIC | Age: 74
End: 2019-10-16
Payer: MEDICARE

## 2019-10-16 PROCEDURE — 93299: CPT

## 2019-10-16 PROCEDURE — 93298 REM INTERROG DEV EVAL SCRMS: CPT

## 2019-10-16 NOTE — ASSESSMENT
[FreeTextEntry1] : Remote Lnq summary\par \par 10-16-19 \par Viewed lnq summary 6-27-19 to 7-14-19.\par No events were noted. \par Next summary on pa schedule 32days-cv

## 2019-11-20 ENCOUNTER — APPOINTMENT (OUTPATIENT)
Dept: CARDIOLOGY | Facility: CLINIC | Age: 74
End: 2019-11-20
Payer: MEDICARE

## 2019-11-20 PROCEDURE — 93299: CPT

## 2019-11-20 PROCEDURE — 93298 REM INTERROG DEV EVAL SCRMS: CPT

## 2019-11-20 NOTE — ASSESSMENT
[FreeTextEntry1] : Remote LNq Summary\par \par \par 11-20-19\par Viewed lnq summary 7-14-19 to 8-28-19. \par No events were noted.\par  Next summary on pa schedule 32days-cv

## 2019-12-27 ENCOUNTER — APPOINTMENT (OUTPATIENT)
Dept: CARDIOLOGY | Facility: CLINIC | Age: 74
End: 2019-12-27
Payer: MEDICARE

## 2019-12-27 PROCEDURE — 93298 REM INTERROG DEV EVAL SCRMS: CPT

## 2019-12-27 PROCEDURE — 93299: CPT

## 2019-12-27 NOTE — ASSESSMENT
[FreeTextEntry1] : Remote LNq Summary\par \par \par 12-27-19\par Viewed lnq summary 8-29-19 to 9-28-19. \par No events were noted.\par  Next summary on pa schedule 32days-cv

## 2020-01-22 ENCOUNTER — APPOINTMENT (OUTPATIENT)
Dept: CARDIOLOGY | Facility: CLINIC | Age: 75
End: 2020-01-22
Payer: MEDICARE

## 2020-01-22 VITALS
BODY MASS INDEX: 25.03 KG/M2 | WEIGHT: 136 LBS | SYSTOLIC BLOOD PRESSURE: 130 MMHG | DIASTOLIC BLOOD PRESSURE: 72 MMHG | HEIGHT: 62 IN | OXYGEN SATURATION: 96 % | HEART RATE: 88 BPM

## 2020-01-22 PROCEDURE — 99214 OFFICE O/P EST MOD 30 MIN: CPT

## 2020-01-22 RX ORDER — BACILLUS COAGULANS/INULIN 1B-250 MG
CAPSULE ORAL
Refills: 0 | Status: DISCONTINUED | COMMUNITY
End: 2020-01-22

## 2020-01-22 RX ORDER — DEXLANSOPRAZOLE 60 MG/1
60 CAPSULE, DELAYED RELEASE ORAL
Refills: 0 | Status: DISCONTINUED | COMMUNITY
End: 2020-01-22

## 2020-01-22 RX ORDER — SERTRALINE HYDROCHLORIDE 100 MG/1
100 TABLET, FILM COATED ORAL DAILY
Refills: 0 | Status: ACTIVE | COMMUNITY

## 2020-01-22 NOTE — REASON FOR VISIT
[Follow-Up - Clinic] : a clinic follow-up of [Atrial Fibrillation] : atrial fibrillation [Coronary Artery Disease] : coronary artery disease [Dyspnea] : dyspnea [Hypertension] : hypertension [Supraventricular Tachycardia] : supraventricular tachycardia [FreeTextEntry1] : 74-year-old female comes in for follow-up consultation.  Since hiatus hernia surgery she still has continued dyspnea on exertion. A mild to moderate level of workload. No PND, orthopnea, pedal edema. No cough, fever, or chills. Often related to anxiety. It is nonprogressive.\par She denies any , palpitation, dizziness, or syncopal episode.\par Her anxiety has significantly improved\par No recent hospital admission.\par No bleeding complications. \par

## 2020-01-22 NOTE — ASSESSMENT
[FreeTextEntry1] : \par Labs March 20, 2018. CBC was stable. Creatinine 2.68 sodium 141, potassium 3.9, TSH 3.3. pro BNP 98.\par Echocardiogram 1/30/18 EF 60%\par Nuclear stress test 1/30/18 No evidence of ischemia\par \par Reviewed on May 16, 2019.\par Hospital information reviewed, which includes evaluation nodes. Labs chest x-ray, EKG. CT scan of the chest and abdomen ordered by gastroenterologist\par \par Reviewed on January 22, 2020\par Nuclear myocardial perfusion scan pharmacological June 11, 2019 was nonischemic\par Echocardiogram February 12, 2019 EF 65% mild mitral regurgitation.  Normal pulmonary pressures.\par Implantable loop recorder so far no significant atrial fibrillation episodes\par \par \par \par

## 2020-01-22 NOTE — PHYSICAL EXAM
[General Appearance - Well Developed] : well developed [Normal Appearance] : normal appearance [Well Groomed] : well groomed [General Appearance - Well Nourished] : well nourished [No Deformities] : no deformities [General Appearance - In No Acute Distress] : no acute distress [Respiration, Rhythm And Depth] : normal respiratory rhythm and effort [Auscultation Breath Sounds / Voice Sounds] : lungs were clear to auscultation bilaterally [Heart Rate And Rhythm] : heart rate and rhythm were normal [Heart Sounds] : normal S1 and S2 [Arterial Pulses Normal] : the arterial pulses were normal [Edema] : no peripheral edema present [Veins - Varicosity Changes] : no varicosital changes were noted in the lower extremities [Abdomen Soft] : soft [Abdomen Tenderness] : non-tender [] : no hepato-splenomegaly [Abdomen Mass (___ Cm)] : no abdominal mass palpated [Abnormal Walk] : normal gait [FreeTextEntry1] : kyphoscoliosis [Nail Clubbing] : no clubbing of the fingernails [Cyanosis, Localized] : no localized cyanosis [Skin Color & Pigmentation] : normal skin color and pigmentation [Oriented To Time, Place, And Person] : oriented to person, place, and time [Impaired Insight] : insight and judgment were intact [Affect] : the affect was normal

## 2020-01-22 NOTE — DISCUSSION/SUMMARY
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 No acute EKG changes.  Recent ischemic evaluation was negative and presence of coronary atherosclerosis, coronary aneurysm. \par echocardiogram had shown an ejection fraction 65% ascending aorta was 3.4 cm\par Continue aspirin.  Continue statin therapy.  Follow labs.\par Continue Cardizem.\par \par implantable loop recorder for paroxysmal atrial fibrillation or atrial arrhythmia status post ablation. Aspirin 81 mg to be taken every other day due to increased ecchymosis and easy bruising. After taking it daily. It is important in presence of coronary aneurysm. Continue diltiazem. Continue to monitor thru [facility]. \par \par Hypertension, blood pressure controlled. Continue current medication. Low sodium diet. \par \par Hyperlipidemia, continue atorvastatin. Lifestyle and risk factor modification.\par \par Shortness of breath,Pulmonary nodules.  Consider pulmonary evaluation.  May or may not benefit from pulmonary rehab.\par \par Thyroid nodule. A CT scan. Followup with primary care physician. Incidental finding.\par \par Counseling regarding low saturated fat, salt and carbohydrate intake was reviewed. Active lifestyle and regular. Exercise along with weight management is advised.\par All the above were at length reviewed. Answered all the questions. Thank you very much for this kind referral. Please do not hesitate to give me a call for any question.\par Part of this transcription was done with voice recognition software and phonetically similar errors are common. I apologize for that. Please donot hesitate to call for any questions due to above.\par \par \par \par \par

## 2020-01-31 ENCOUNTER — APPOINTMENT (OUTPATIENT)
Dept: CARDIOLOGY | Facility: CLINIC | Age: 75
End: 2020-01-31
Payer: MEDICARE

## 2020-01-31 PROCEDURE — 93298 REM INTERROG DEV EVAL SCRMS: CPT

## 2020-01-31 PROCEDURE — G2066: CPT

## 2020-01-31 NOTE — ASSESSMENT
[FreeTextEntry1] : Remote LNq Summary\par \par 1-31-20 \par Viewed lnq summary 9-28-19 to 10-29-19 and 10-29-19 to 11-29-19 (to catch up on automatic cycle, approved by Denisa). \par No events were noted. \par Next summary on pa schedule 32days-cv

## 2020-03-19 ENCOUNTER — APPOINTMENT (OUTPATIENT)
Dept: CARDIOLOGY | Facility: CLINIC | Age: 75
End: 2020-03-19
Payer: MEDICARE

## 2020-03-19 PROCEDURE — 93298 REM INTERROG DEV EVAL SCRMS: CPT

## 2020-03-19 PROCEDURE — G2066: CPT

## 2020-03-19 NOTE — ASSESSMENT
[FreeTextEntry1] : Remote LNq Summary\par \par 3-19-20 \par Viewed lnq summary 11-29-19 to 12-30-19.\par No events were noted. \par Next summary on pa schedule 32days-cv

## 2020-04-21 ENCOUNTER — APPOINTMENT (OUTPATIENT)
Dept: CARDIOLOGY | Facility: CLINIC | Age: 75
End: 2020-04-21
Payer: MEDICARE

## 2020-04-21 PROCEDURE — 93298 REM INTERROG DEV EVAL SCRMS: CPT

## 2020-04-21 PROCEDURE — G2066: CPT

## 2020-04-22 NOTE — ASSESSMENT
[FreeTextEntry1] : Remote LNq summary\par \par 4-21-20 \par Viewed lnq summary 12-30-19 to 1-31-20. \par No events were noted. \par Next summary on pa schedule 32days-cv

## 2020-04-28 ENCOUNTER — APPOINTMENT (OUTPATIENT)
Dept: CARDIOLOGY | Facility: CLINIC | Age: 75
End: 2020-04-28

## 2020-06-02 ENCOUNTER — APPOINTMENT (OUTPATIENT)
Dept: CARDIOLOGY | Facility: CLINIC | Age: 75
End: 2020-06-02
Payer: MEDICARE

## 2020-06-02 PROCEDURE — G2066: CPT

## 2020-06-02 PROCEDURE — 93298 REM INTERROG DEV EVAL SCRMS: CPT

## 2020-06-02 NOTE — ASSESSMENT
[FreeTextEntry1] : Remote Lnq Summary\par \par \par 6-2-20 \par Viewed lnq summary 1-31-20 to 3-2-20 and 3-2-20 to 4-2-20 (to catch up on automatic cycle, approved by Denisa). \par No events were noted.\par  Next summary on pa schedule 32days-cv

## 2020-06-05 ENCOUNTER — APPOINTMENT (OUTPATIENT)
Dept: CARDIOLOGY | Facility: CLINIC | Age: 75
End: 2020-06-05

## 2020-07-10 ENCOUNTER — APPOINTMENT (OUTPATIENT)
Dept: CARDIOLOGY | Facility: CLINIC | Age: 75
End: 2020-07-10
Payer: MEDICARE

## 2020-07-10 PROCEDURE — 93298 REM INTERROG DEV EVAL SCRMS: CPT

## 2020-07-10 PROCEDURE — G2066: CPT

## 2020-07-10 NOTE — ASSESSMENT
[FreeTextEntry1] : Remote LNq Summary\par \par \par 7-10-20 \par Viewed lnq summary 4-2-20 to 4-30-20. \par No events were noted. \par Next summary on pa schedule 32days-cv

## 2020-07-14 ENCOUNTER — APPOINTMENT (OUTPATIENT)
Dept: CARDIOLOGY | Facility: CLINIC | Age: 75
End: 2020-07-14

## 2020-08-11 ENCOUNTER — APPOINTMENT (OUTPATIENT)
Dept: CARDIOLOGY | Facility: CLINIC | Age: 75
End: 2020-08-11
Payer: MEDICARE

## 2020-08-11 PROCEDURE — G2066: CPT

## 2020-08-11 PROCEDURE — 93298 REM INTERROG DEV EVAL SCRMS: CPT

## 2020-08-11 NOTE — ASSESSMENT
[FreeTextEntry1] : Remote LNQ Summary\par \par 8-11-20\par Viewed lnq summary 4-30-20 to 6-3-20 and 6-3-20 to 7-8-20 (approved by admin). \par No events were noted. \par Pending f/u with RP 8/25\par Next summary on pa sched 32d-cv\par

## 2020-08-18 ENCOUNTER — APPOINTMENT (OUTPATIENT)
Dept: CARDIOLOGY | Facility: CLINIC | Age: 75
End: 2020-08-18

## 2020-08-25 ENCOUNTER — NON-APPOINTMENT (OUTPATIENT)
Age: 75
End: 2020-08-25

## 2020-08-25 ENCOUNTER — APPOINTMENT (OUTPATIENT)
Dept: CARDIOLOGY | Facility: CLINIC | Age: 75
End: 2020-08-25
Payer: MEDICARE

## 2020-08-25 VITALS
HEIGHT: 62 IN | BODY MASS INDEX: 25.21 KG/M2 | HEART RATE: 87 BPM | OXYGEN SATURATION: 96 % | SYSTOLIC BLOOD PRESSURE: 102 MMHG | DIASTOLIC BLOOD PRESSURE: 68 MMHG | TEMPERATURE: 97.3 F | WEIGHT: 137 LBS

## 2020-08-25 PROCEDURE — 99214 OFFICE O/P EST MOD 30 MIN: CPT

## 2020-08-25 PROCEDURE — 93000 ELECTROCARDIOGRAM COMPLETE: CPT

## 2020-08-25 RX ORDER — ALBUTEROL SULFATE 90 UG/1
108 (90 BASE) INHALANT RESPIRATORY (INHALATION)
Qty: 8 | Refills: 0 | Status: ACTIVE | COMMUNITY
Start: 2020-06-15

## 2020-08-25 NOTE — DISCUSSION/SUMMARY
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 No acute EKG changes.  Recent ischemic evaluation was negative and presence of coronary atherosclerosis, coronary aneurysm. \par echocardiogram had shown an ejection fraction 65% ascending aorta was 3.4 cm, mitral insufficiency.\par Continue aspirin.  Continue statin therapy.  Follow labs.\par Continue Cardizem.\par . Aspirin 81 mg to be taken every other day due to increased ecchymosis and easy bruising. After taking it daily. It is important in presence of coronary aneurysm. Continue diltiazem.\par Prescription done\par Echocardiogram will be repeated to follow-up on valvular heart disease LV ejection fraction left atrial size.  Specifically in presence of continued dyspnea on exertion.\par \par Hypertension, blood pressure controlled. Continue current medication. Low sodium diet. \par \par Hyperlipidemia, continue atorvastatin. Lifestyle and risk factor modification.\par \par Shortness of breath,Pulmonary nodules.  No significant improvement since\par \par Hiatus hernia surgery.  She has severe kyphoscoliosis probably the reason with restrictive lung disease.  Follow-up with pulmonary recommended.\par \par \par Counseling regarding low saturated fat, salt and carbohydrate intake was reviewed. Active lifestyle and regular. Exercise along with weight management is advised.\par All the above were at length reviewed. Answered all the questions. Thank you very much for this kind referral. Please do not hesitate to give me a call for any question.\par Part of this transcription was done with voice recognition software and phonetically similar errors are common. I apologize for that. Please donot hesitate to call for any questions due to above.\par \par \par \par \par

## 2020-08-25 NOTE — HISTORY OF PRESENT ILLNESS
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

## 2020-08-25 NOTE — REVIEW OF SYSTEMS
[see HPI] : see HPI [Shortness Of Breath] : shortness of breath [Dyspnea on exertion] : dyspnea during exertion [Joint Pain] : joint pain [Anxiety] : anxiety [Negative] : Heme/Lymph [Chest Pain] : no chest pain [Chest  Pressure] : no chest pressure [Leg Claudication] : no intermittent leg claudication [Palpitations] : no palpitations [Wheezing] : no wheezing [Confusion] : no confusion was observed [Memory Lapses Or Loss] : no memory lapses or loss [Under Stress] : not under stress [Depression] : no depression [Suicidal] : not suicidal

## 2020-08-25 NOTE — ASSESSMENT
[FreeTextEntry1] : \par Labs March 20, 2018. CBC was stable. Creatinine 2.68 sodium 141, potassium 3.9, TSH 3.3. pro BNP 98.\par Echocardiogram 1/30/18 EF 60%\par Nuclear stress test 1/30/18 No evidence of ischemia\par \par Reviewed on May 16, 2019.\par Hospital information reviewed, which includes evaluation nodes. Labs chest x-ray, EKG. CT scan of the chest and abdomen ordered by gastroenterologist\par \par Reviewed on January 22, 2020\par Nuclear myocardial perfusion scan pharmacological June 11, 2019 was nonischemic\par Echocardiogram February 12, 2019 EF 65% mild mitral regurgitation.  Normal pulmonary pressures.\par Implantable loop recorder so far no significant atrial fibrillation episodes\par \par Reviewed on August 25, 2020.\par EKG August 25, 2020.  Indication history of paroxysmal atrial fibrillation PSVT.  Interpretation normal sinus rhythm low voltage complexes.\par Implantable loop recorder readings for the last few months reviewed.  No episode of atrial fibrillation.\par Labs from June hospital admission requested\par \par \par \par

## 2020-08-25 NOTE — PHYSICAL EXAM
[General Appearance - Well Developed] : well developed [Normal Appearance] : normal appearance [Well Groomed] : well groomed [General Appearance - Well Nourished] : well nourished [No Deformities] : no deformities [General Appearance - In No Acute Distress] : no acute distress [Auscultation Breath Sounds / Voice Sounds] : lungs were clear to auscultation bilaterally [Respiration, Rhythm And Depth] : normal respiratory rhythm and effort [Heart Rate And Rhythm] : heart rate and rhythm were normal [Heart Sounds] : normal S1 and S2 [Edema] : no peripheral edema present [Arterial Pulses Normal] : the arterial pulses were normal [Veins - Varicosity Changes] : no varicosital changes were noted in the lower extremities [Abdomen Soft] : soft [Abdomen Tenderness] : non-tender [] : no hepato-splenomegaly [Abnormal Walk] : normal gait [Abdomen Mass (___ Cm)] : no abdominal mass palpated [Nail Clubbing] : no clubbing of the fingernails [Cyanosis, Localized] : no localized cyanosis [Skin Color & Pigmentation] : normal skin color and pigmentation [Oriented To Time, Place, And Person] : oriented to person, place, and time [Affect] : the affect was normal [Impaired Insight] : insight and judgment were intact [FreeTextEntry1] : kyphoscoliosis

## 2020-08-25 NOTE — CARDIOLOGY SUMMARY
[No Ischemia] : no Ischemia [No Symptoms] : no Symptoms [No Exercise Ind Arr] : no exercise induced arrhythmias [___] : [unfilled] [LVEF ___%] : LVEF [unfilled]% [None] : no pulmonary hypertension [Mild] : mild LV dysfunction [Normal] : normal LA size

## 2020-09-15 ENCOUNTER — APPOINTMENT (OUTPATIENT)
Dept: CARDIOLOGY | Facility: CLINIC | Age: 75
End: 2020-09-15
Payer: MEDICARE

## 2020-09-15 PROCEDURE — 93298 REM INTERROG DEV EVAL SCRMS: CPT

## 2020-09-15 PROCEDURE — G2066: CPT

## 2020-09-15 NOTE — ASSESSMENT
[FreeTextEntry1] : Remote LNq summary\par \par 9-15-20 \par Viewed lnq summry 7-8-20 to 8-12-20.\par  No events were noted.\par  Next summary on pa schedule 32d-cv

## 2020-09-17 ENCOUNTER — TRANSCRIPTION ENCOUNTER (OUTPATIENT)
Age: 75
End: 2020-09-17

## 2020-09-17 ENCOUNTER — APPOINTMENT (OUTPATIENT)
Dept: CARDIOLOGY | Facility: CLINIC | Age: 75
End: 2020-09-17
Payer: MEDICARE

## 2020-09-17 PROCEDURE — 93306 TTE W/DOPPLER COMPLETE: CPT

## 2020-10-20 ENCOUNTER — APPOINTMENT (OUTPATIENT)
Dept: CARDIOLOGY | Facility: CLINIC | Age: 75
End: 2020-10-20
Payer: MEDICARE

## 2020-10-20 PROCEDURE — 93298 REM INTERROG DEV EVAL SCRMS: CPT

## 2020-10-20 PROCEDURE — G2066: CPT

## 2020-10-20 NOTE — ASSESSMENT
[FreeTextEntry1] : Remote Lnq Summary\par \par \par 10-20-20\par Viewed lnq summary 8-12-20 to 9-16-20. \par No events were noted.\par  Next summary on pa schedule 32d-cv

## 2020-11-23 ENCOUNTER — APPOINTMENT (OUTPATIENT)
Dept: CARDIOLOGY | Facility: CLINIC | Age: 75
End: 2020-11-23
Payer: MEDICARE

## 2020-11-23 PROCEDURE — G2066: CPT

## 2020-11-23 PROCEDURE — 93298 REM INTERROG DEV EVAL SCRMS: CPT

## 2020-11-23 NOTE — ASSESSMENT
[FreeTextEntry1] : Remote LNq Summary\par \par \par 11-23-20 \par Viewed lnq summary 9-16-20 to 10-21-20. \par No events were noted.\par  Next summary on pa schedule 32d-cv

## 2021-01-04 ENCOUNTER — APPOINTMENT (OUTPATIENT)
Dept: CARDIOLOGY | Facility: CLINIC | Age: 76
End: 2021-01-04
Payer: MEDICARE

## 2021-01-04 PROCEDURE — G2066: CPT

## 2021-01-04 PROCEDURE — 93298 REM INTERROG DEV EVAL SCRMS: CPT

## 2021-01-04 NOTE — ASSESSMENT
[FreeTextEntry1] : Remote LNq Summary 1/4/21\par \par Viewed lnq summary 10-21-20 to 11/23/20. \par No events were noted.\par Next summary 32d

## 2021-02-05 ENCOUNTER — APPOINTMENT (OUTPATIENT)
Dept: CARDIOLOGY | Facility: CLINIC | Age: 76
End: 2021-02-05
Payer: MEDICARE

## 2021-02-05 PROCEDURE — G2066: CPT

## 2021-02-05 PROCEDURE — 93298 REM INTERROG DEV EVAL SCRMS: CPT

## 2021-02-08 NOTE — ASSESSMENT
[FreeTextEntry1] : Remote Lnq Summary\par \par \par \par 2/5/21\par 1 viewed lnq summary 11/23/20 to 12/30/20 and 12/30/20 to 2/3/21 (approved by admin). \par No events were noted. \par Next summary on daryl ward 32d-cv

## 2021-03-22 ENCOUNTER — APPOINTMENT (OUTPATIENT)
Dept: CARDIOLOGY | Facility: CLINIC | Age: 76
End: 2021-03-22
Payer: MEDICARE

## 2021-03-22 ENCOUNTER — NON-APPOINTMENT (OUTPATIENT)
Age: 76
End: 2021-03-22

## 2021-03-22 PROCEDURE — 93298 REM INTERROG DEV EVAL SCRMS: CPT

## 2021-03-22 PROCEDURE — G2066: CPT

## 2021-04-27 ENCOUNTER — NON-APPOINTMENT (OUTPATIENT)
Age: 76
End: 2021-04-27

## 2021-04-27 ENCOUNTER — APPOINTMENT (OUTPATIENT)
Dept: CARDIOLOGY | Facility: CLINIC | Age: 76
End: 2021-04-27
Payer: MEDICARE

## 2021-04-27 PROCEDURE — G2066: CPT

## 2021-04-27 PROCEDURE — 93298 REM INTERROG DEV EVAL SCRMS: CPT

## 2021-05-01 ENCOUNTER — NON-APPOINTMENT (OUTPATIENT)
Age: 76
End: 2021-05-01

## 2021-05-21 ENCOUNTER — APPOINTMENT (OUTPATIENT)
Dept: CARDIOLOGY | Facility: CLINIC | Age: 76
End: 2021-05-21

## 2021-05-24 ENCOUNTER — APPOINTMENT (OUTPATIENT)
Dept: ELECTROPHYSIOLOGY | Facility: CLINIC | Age: 76
End: 2021-05-24
Payer: MEDICARE

## 2021-05-24 VITALS
HEART RATE: 89 BPM | WEIGHT: 140 LBS | BODY MASS INDEX: 25.76 KG/M2 | SYSTOLIC BLOOD PRESSURE: 120 MMHG | DIASTOLIC BLOOD PRESSURE: 70 MMHG | HEIGHT: 62 IN | TEMPERATURE: 97.5 F | OXYGEN SATURATION: 96 %

## 2021-05-24 PROCEDURE — 93000 ELECTROCARDIOGRAM COMPLETE: CPT

## 2021-05-24 PROCEDURE — 99205 OFFICE O/P NEW HI 60 MIN: CPT

## 2021-05-24 PROCEDURE — 99072 ADDL SUPL MATRL&STAF TM PHE: CPT

## 2021-05-28 ENCOUNTER — APPOINTMENT (OUTPATIENT)
Dept: CARDIOLOGY | Facility: CLINIC | Age: 76
End: 2021-05-28

## 2021-05-28 ENCOUNTER — NON-APPOINTMENT (OUTPATIENT)
Age: 76
End: 2021-05-28

## 2021-06-06 ENCOUNTER — TRANSCRIPTION ENCOUNTER (OUTPATIENT)
Age: 76
End: 2021-06-06

## 2021-06-08 ENCOUNTER — NON-APPOINTMENT (OUTPATIENT)
Age: 76
End: 2021-06-08

## 2021-06-08 NOTE — PHYSICAL EXAM
[No Acute Distress] : no acute distress [Normal Conjunctiva] : normal conjunctiva [Normal Venous Pressure] : normal venous pressure [Normal S1, S2] : normal S1, S2 [Clear Lung Fields] : clear lung fields [Non Tender] : non-tender [No Edema] : no edema [Alert and Oriented] : alert and oriented

## 2021-06-08 NOTE — HISTORY OF PRESENT ILLNESS
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  She also reportedly has had paroxysmal A. fib although from the records it was not clear letter was SVT degenerated to atrial fibrillation.  She is status post implantation of an implantable loop monitor.  The implantable loop monitor is now at end of service since May 21, 2021.  Patient would like to have it removed and replaced because of the concern for future atrial fibrillation.\par \par She is currently feeling well denies any chest pain, shortness of breath, palpitations, dizziness, lightens, syncope or presyncope.\par \par She has a prior history of lung nodule was previously followed by pulmonologist.\par \par She has a prior history of asthma.\par \par Patient is currently on diltiazem  mg daily.  She is also on aspirin and albuterol in addition to other medications.

## 2021-06-08 NOTE — DISCUSSION/SUMMARY
[FreeTextEntry1] : The patient has had a prior history of SVT and question atrial fibrillation.  She has been doing well.  Interrogation of the implantable loop monitor showed her last episode of tachycardia was in 2018.  The monitor is now at end of service.  She would like to have the removal and replacement.  The reason for replacement she is at risk for recurrent A. fib and patient would like to know.  She is currently not on anticoagulation except for aspirin.  Her blood pressures has been in the normal range.\par \par Previous echocardiogram from September 2020 showed preserved left ventricular function, mild mitral regurgitation and left atrial diameter 3.4 cm with left atrial volume index 30 cc/m².\par \par We discussed the procedure.  Patient understands and will schedule.

## 2021-06-08 NOTE — REVIEW OF SYSTEMS
[Feeling Fatigued] : not feeling fatigued [Blurry Vision] : no blurred vision [Sore Throat] : no sore throat [SOB] : no shortness of breath [Dyspnea on exertion] : not dyspnea during exertion [Palpitations] : no palpitations [Cough] : no cough [Abdominal Pain] : no abdominal pain [Dizziness] : no dizziness [Easy Bleeding] : no tendency for easy bleeding

## 2021-06-16 ENCOUNTER — OUTPATIENT (OUTPATIENT)
Dept: OUTPATIENT SERVICES | Facility: HOSPITAL | Age: 76
LOS: 1 days | End: 2021-06-16
Payer: MEDICARE

## 2021-06-16 PROCEDURE — 33286 RMVL SUBQ CAR RHYTHM MNTR: CPT | Mod: XS

## 2021-06-16 PROCEDURE — 33285 INSJ SUBQ CAR RHYTHM MNTR: CPT

## 2021-06-25 ENCOUNTER — APPOINTMENT (OUTPATIENT)
Dept: CARDIOLOGY | Facility: CLINIC | Age: 76
End: 2021-06-25

## 2021-06-30 ENCOUNTER — APPOINTMENT (OUTPATIENT)
Dept: CARDIOLOGY | Facility: CLINIC | Age: 76
End: 2021-06-30
Payer: MEDICARE

## 2021-06-30 VITALS
HEIGHT: 62 IN | TEMPERATURE: 94.1 F | BODY MASS INDEX: 26.13 KG/M2 | HEART RATE: 91 BPM | SYSTOLIC BLOOD PRESSURE: 134 MMHG | WEIGHT: 142 LBS | OXYGEN SATURATION: 95 % | DIASTOLIC BLOOD PRESSURE: 62 MMHG

## 2021-06-30 PROCEDURE — 93291 INTERROG DEV EVAL SCRMS IP: CPT

## 2021-06-30 PROCEDURE — 99024 POSTOP FOLLOW-UP VISIT: CPT

## 2021-06-30 RX ORDER — LEVALBUTEROL TARTRATE 45 UG/1
45 AEROSOL, METERED ORAL EVERY 4 HOURS
Refills: 0 | Status: DISCONTINUED | COMMUNITY
Start: 2018-09-25 | End: 2021-06-30

## 2021-07-07 ENCOUNTER — TRANSCRIPTION ENCOUNTER (OUTPATIENT)
Age: 76
End: 2021-07-07

## 2021-07-14 ENCOUNTER — TRANSCRIPTION ENCOUNTER (OUTPATIENT)
Age: 76
End: 2021-07-14

## 2021-07-29 ENCOUNTER — NON-APPOINTMENT (OUTPATIENT)
Age: 76
End: 2021-07-29

## 2021-07-30 ENCOUNTER — APPOINTMENT (OUTPATIENT)
Dept: CARDIOLOGY | Facility: CLINIC | Age: 76
End: 2021-07-30

## 2021-07-30 ENCOUNTER — APPOINTMENT (OUTPATIENT)
Dept: CARDIOLOGY | Facility: CLINIC | Age: 76
End: 2021-07-30
Payer: MEDICARE

## 2021-07-30 PROCEDURE — G2066: CPT

## 2021-07-30 PROCEDURE — 93298 REM INTERROG DEV EVAL SCRMS: CPT

## 2021-08-31 ENCOUNTER — APPOINTMENT (OUTPATIENT)
Dept: CARDIOLOGY | Facility: CLINIC | Age: 76
End: 2021-08-31
Payer: MEDICARE

## 2021-08-31 VITALS
DIASTOLIC BLOOD PRESSURE: 74 MMHG | HEART RATE: 87 BPM | OXYGEN SATURATION: 96 % | WEIGHT: 135 LBS | HEIGHT: 62 IN | BODY MASS INDEX: 24.84 KG/M2 | TEMPERATURE: 97.5 F | SYSTOLIC BLOOD PRESSURE: 126 MMHG

## 2021-08-31 PROCEDURE — 99214 OFFICE O/P EST MOD 30 MIN: CPT

## 2021-08-31 NOTE — REVIEW OF SYSTEMS
[Feeling Fatigued] : feeling fatigued [SOB] : shortness of breath [Dyspnea on exertion] : dyspnea during exertion [Anxiety] : anxiety [Under Stress] : under stress [Negative] : Heme/Lymph [Joint Pain] : joint pain [Joint Stiffness] : joint stiffness

## 2021-08-31 NOTE — REASON FOR VISIT
[FreeTextEntry3] : Dr. Madison Santiago [FreeTextEntry1] : 75-year-old female comes in for follow-up consultation.  Since hiatus hernia surgery she still has continued dyspnea on exertion. A mild to moderate level of workload. No PND, orthopnea, pedal edema. No cough, fever, or chills. Often related to anxiety. It is nonprogressive.  She is now seeing pulmonologist for further evaluation in relation to restrictive lung disease secondary to significant kyphoscoliosis.\par No chest pain.\par She denies any , palpitation, dizziness, or syncopal episode.\par Her anxiety has significantly improved\par No bleeding complications. \par

## 2021-08-31 NOTE — DISCUSSION/SUMMARY
[Patient] : the patient [Risks] : risks [Benefits] : benefits [Alternatives] : alternatives [FreeTextEntry1] : 75 year-old female with history of hypertension, AVNRT status post ablation, and presence of paroxysmal SVT or paroxysmal atrial fibrillation, status post implantable loop recorder without any significant complications or evidence of atrial fibrillation.  Being followed regularly in the office.\par Left anterior descending artery was noted to be aneurysmal without any significant obstructive disease.\par Now with atypical chest pain, resolved on its own. No acute EKG changes.  Recent ischemic evaluation was negative and presence of coronary atherosclerosis, coronary aneurysm. \par echocardiogram had shown an ejection fraction 65% ascending aorta was 3.4 cm, mitral insufficiency.\par Continue aspirin.  Continue statin therapy.  Follow labs.\par Continue Cardizem.\par Aspirin 81 mg to be taken every other day due to increased ecchymosis and easy bruising. After taking it daily. It is important in presence of coronary aneurysm. Continue diltiazem.\par Labs ordered which includes BNP\par \par Hypertension, blood pressure controlled. Continue current medication. Low sodium diet.  Prescription done\par \par Hyperlipidemia, continue atorvastatin. Lifestyle and risk factor modification.\par \par Shortness of breath,Pulmonary nodules.  No significant improvement since\par \par   She has severe kyphoscoliosis probably the reason with restrictive lung disease.  Continue follow-up with pulmonology\par \par \par Counseling regarding low saturated fat, salt and carbohydrate intake was reviewed. Active lifestyle and regular. Exercise along with weight management is advised.\par All the above were at length reviewed. Answered all the questions. Thank you very much for this kind referral. Please do not hesitate to give me a call for any question.\par Part of this transcription was done with voice recognition software and phonetically similar errors are common. I apologize for that. Please donot hesitate to call for any questions due to above.\par

## 2021-08-31 NOTE — CARDIOLOGY SUMMARY
[No Ischemia] : no Ischemia [No Exercise Ind Arr] : no exercise induced arrhythmias [No Symptoms] : no Symptoms [LVEF ___%] : LVEF [unfilled]% [___] : [unfilled] [Mild] : mild LV dysfunction [None] : no pulmonary hypertension [Normal] : normal LA size [de-identified] : September 17, 2020 EF 60 to 65% mild mitral regurgitation normal left atrium

## 2021-08-31 NOTE — PHYSICAL EXAM
[General Appearance - Well Developed] : well developed [Normal Appearance] : normal appearance [Well Groomed] : well groomed [General Appearance - Well Nourished] : well nourished [No Deformities] : no deformities [General Appearance - In No Acute Distress] : no acute distress [Respiration, Rhythm And Depth] : normal respiratory rhythm and effort [Auscultation Breath Sounds / Voice Sounds] : lungs were clear to auscultation bilaterally [Heart Rate And Rhythm] : heart rate and rhythm were normal [Heart Sounds] : normal S1 and S2 [Arterial Pulses Normal] : the arterial pulses were normal [Edema] : no peripheral edema present [Veins - Varicosity Changes] : no varicosital changes were noted in the lower extremities [Abdomen Soft] : soft [Abdomen Tenderness] : non-tender [] : no hepato-splenomegaly [Abdomen Mass (___ Cm)] : no abdominal mass palpated [Abnormal Walk] : normal gait [Nail Clubbing] : no clubbing of the fingernails [Cyanosis, Localized] : no localized cyanosis [Skin Color & Pigmentation] : normal skin color and pigmentation [Oriented To Time, Place, And Person] : oriented to person, place, and time [Impaired Insight] : insight and judgment were intact [Affect] : the affect was normal [FreeTextEntry1] : kyphoscoliosis

## 2021-09-02 ENCOUNTER — NON-APPOINTMENT (OUTPATIENT)
Age: 76
End: 2021-09-02

## 2021-09-03 ENCOUNTER — APPOINTMENT (OUTPATIENT)
Dept: CARDIOLOGY | Facility: CLINIC | Age: 76
End: 2021-09-03
Payer: MEDICARE

## 2021-09-03 PROCEDURE — 93298 REM INTERROG DEV EVAL SCRMS: CPT

## 2021-09-03 PROCEDURE — G2066: CPT

## 2021-10-07 ENCOUNTER — NON-APPOINTMENT (OUTPATIENT)
Age: 76
End: 2021-10-07

## 2021-10-08 ENCOUNTER — APPOINTMENT (OUTPATIENT)
Dept: CARDIOLOGY | Facility: CLINIC | Age: 76
End: 2021-10-08
Payer: MEDICARE

## 2021-10-08 PROCEDURE — 93298 REM INTERROG DEV EVAL SCRMS: CPT

## 2021-10-08 PROCEDURE — G2066: CPT

## 2021-11-03 ENCOUNTER — NON-APPOINTMENT (OUTPATIENT)
Age: 76
End: 2021-11-03

## 2021-11-12 ENCOUNTER — NON-APPOINTMENT (OUTPATIENT)
Age: 76
End: 2021-11-12

## 2021-11-12 ENCOUNTER — APPOINTMENT (OUTPATIENT)
Dept: CARDIOLOGY | Facility: CLINIC | Age: 76
End: 2021-11-12
Payer: MEDICARE

## 2021-11-12 PROCEDURE — 93298 REM INTERROG DEV EVAL SCRMS: CPT

## 2021-11-12 PROCEDURE — G2066: CPT

## 2021-12-22 ENCOUNTER — NON-APPOINTMENT (OUTPATIENT)
Age: 76
End: 2021-12-22

## 2021-12-23 ENCOUNTER — APPOINTMENT (OUTPATIENT)
Dept: CARDIOLOGY | Facility: CLINIC | Age: 76
End: 2021-12-23
Payer: MEDICARE

## 2021-12-23 PROCEDURE — 93298 REM INTERROG DEV EVAL SCRMS: CPT

## 2021-12-23 PROCEDURE — G2066: CPT

## 2022-01-26 ENCOUNTER — NON-APPOINTMENT (OUTPATIENT)
Age: 77
End: 2022-01-26

## 2022-01-27 ENCOUNTER — APPOINTMENT (OUTPATIENT)
Dept: CARDIOLOGY | Facility: CLINIC | Age: 77
End: 2022-01-27
Payer: MEDICARE

## 2022-01-27 PROCEDURE — G2066: CPT

## 2022-01-27 PROCEDURE — 93298 REM INTERROG DEV EVAL SCRMS: CPT

## 2022-02-07 ENCOUNTER — APPOINTMENT (OUTPATIENT)
Dept: CARDIOLOGY | Facility: CLINIC | Age: 77
End: 2022-02-07
Payer: MEDICARE

## 2022-02-07 VITALS
SYSTOLIC BLOOD PRESSURE: 130 MMHG | BODY MASS INDEX: 25.4 KG/M2 | HEART RATE: 99 BPM | DIASTOLIC BLOOD PRESSURE: 72 MMHG | HEIGHT: 62 IN | WEIGHT: 138 LBS | OXYGEN SATURATION: 95 %

## 2022-02-07 PROCEDURE — 99214 OFFICE O/P EST MOD 30 MIN: CPT

## 2022-02-07 RX ORDER — MULTIVITAMIN
TABLET ORAL
Refills: 0 | Status: ACTIVE | COMMUNITY

## 2022-02-07 NOTE — PHYSICAL EXAM
[General Appearance - Well Developed] : well developed [Normal Appearance] : normal appearance [Well Groomed] : well groomed [General Appearance - Well Nourished] : well nourished [No Deformities] : no deformities [General Appearance - In No Acute Distress] : no acute distress [] : no respiratory distress [Respiration, Rhythm And Depth] : normal respiratory rhythm and effort [Auscultation Breath Sounds / Voice Sounds] : lungs were clear to auscultation bilaterally [Heart Rate And Rhythm] : heart rate and rhythm were normal [Heart Sounds] : normal S1 and S2 [Arterial Pulses Normal] : the arterial pulses were normal [Edema] : no peripheral edema present [Veins - Varicosity Changes] : no varicosital changes were noted in the lower extremities [Nail Clubbing] : no clubbing of the fingernails [Cyanosis, Localized] : no localized cyanosis [Oriented To Time, Place, And Person] : oriented to person, place, and time [Impaired Insight] : insight and judgment were intact [Affect] : the affect was normal [FreeTextEntry1] : kyphoscoliosis, walks with a walker

## 2022-02-07 NOTE — DISCUSSION/SUMMARY
[Patient] : the patient [Risks] : risks [Benefits] : benefits [Alternatives] : alternatives [FreeTextEntry1] : 75 year-old female with history of hypertension, AVNRT status post ablation, and presence of paroxysmal SVT or paroxysmal atrial fibrillation, status post implantable loop recorder without any significant complications or evidence of atrial fibrillation.  Being followed regularly in the office.\par Left anterior descending artery was noted to be aneurysmal without any significant obstructive disease.\par 2019 nonischemic perfusion scan.\par echocardiogram had shown an ejection fraction 65% ascending aorta was 3.4 cm, mitral insufficiency.\par Continue aspirin.  Continue statin therapy.\par Continue Cardizem.\par Aspirin 81 mg to be taken every other day due to increased ecchymosis and easy bruising. After taking it daily. It is important in presence of coronary aneurysm. Continue diltiazem.\par \par \par Hypertension, blood pressure controlled. Continue current medication. Low sodium diet.  Prescription done\par \par Hyperlipidemia, continue atorvastatin.  Increase dose to 40 mg.  Try to aim LDL less than 70.  Risk benefits alternatives reviewed.  Lifestyle and risk factor modification.\par \par Shortness of breath,Pulmonary nodules.  No significant improvement since\par \par   She has severe kyphoscoliosis probably the reason with restrictive lung disease.  Continue follow-up with pulmonology\par \par \par Counseling regarding low saturated fat, salt and carbohydrate intake was reviewed. Active lifestyle and regular. Exercise along with weight management is advised.\par All the above were at length reviewed. Answered all the questions. Thank you very much for this kind referral. Please do not hesitate to give me a call for any question.\par Part of this transcription was done with voice recognition software and phonetically similar errors are common. I apologize for that. Please donot hesitate to call for any questions due to above.\par

## 2022-02-07 NOTE — CARDIOLOGY SUMMARY
[No Ischemia] : no Ischemia [No Exercise Ind Arr] : no exercise induced arrhythmias [No Symptoms] : no Symptoms [LVEF ___%] : LVEF [unfilled]% [___] : [unfilled] [Mild] : mild LV dysfunction [None] : no pulmonary hypertension [Normal] : normal LA size [de-identified] : September 17, 2020 EF 60 to 65% mild mitral regurgitation normal left atrium

## 2022-02-07 NOTE — REASON FOR VISIT
[FreeTextEntry3] : Dr. Madison Santiago [FreeTextEntry1] : 75-year-old female comes in for follow-up consultation. she still has continued dyspnea on exertion. A mild to moderate level of workload.  No complaint of chest pain.  No PND, orthopnea, pedal edema. No cough, fever, or chills. Often related to anxiety. It is nonprogressive.  She is now seeing pulmonologist for further evaluation in relation to restrictive lung disease secondary to significant kyphoscoliosis.\par No chest pain.\par She denies any , palpitation, dizziness, or syncopal episode.\par Her anxiety has significantly improved\par No bleeding complications. \par

## 2022-02-07 NOTE — REVIEW OF SYSTEMS
[Feeling Fatigued] : feeling fatigued [SOB] : shortness of breath [Dyspnea on exertion] : dyspnea during exertion [Joint Pain] : joint pain [Joint Stiffness] : joint stiffness [Anxiety] : anxiety [Under Stress] : under stress [Negative] : Heme/Lymph

## 2022-02-21 ENCOUNTER — APPOINTMENT (OUTPATIENT)
Dept: CARDIOLOGY | Facility: CLINIC | Age: 77
End: 2022-02-21
Payer: COMMERCIAL

## 2022-02-21 PROCEDURE — 93306 TTE W/DOPPLER COMPLETE: CPT

## 2022-03-03 ENCOUNTER — APPOINTMENT (OUTPATIENT)
Dept: CARDIOLOGY | Facility: CLINIC | Age: 77
End: 2022-03-03
Payer: COMMERCIAL

## 2022-03-03 ENCOUNTER — NON-APPOINTMENT (OUTPATIENT)
Age: 77
End: 2022-03-03

## 2022-03-03 PROCEDURE — 93298 REM INTERROG DEV EVAL SCRMS: CPT

## 2022-03-03 PROCEDURE — G2066: CPT

## 2022-03-28 ENCOUNTER — NON-APPOINTMENT (OUTPATIENT)
Age: 77
End: 2022-03-28

## 2022-04-07 ENCOUNTER — APPOINTMENT (OUTPATIENT)
Dept: CARDIOLOGY | Facility: CLINIC | Age: 77
End: 2022-04-07
Payer: MEDICARE

## 2022-04-07 ENCOUNTER — NON-APPOINTMENT (OUTPATIENT)
Age: 77
End: 2022-04-07

## 2022-04-07 PROCEDURE — G2066: CPT

## 2022-04-07 PROCEDURE — 93298 REM INTERROG DEV EVAL SCRMS: CPT

## 2022-04-21 ENCOUNTER — NON-APPOINTMENT (OUTPATIENT)
Age: 77
End: 2022-04-21

## 2022-04-26 DIAGNOSIS — R06.89 OTHER ABNORMALITIES OF BREATHING: ICD-10-CM

## 2022-04-26 DIAGNOSIS — R06.00 DYSPNEA, UNSPECIFIED: ICD-10-CM

## 2022-05-02 ENCOUNTER — APPOINTMENT (OUTPATIENT)
Dept: ORTHOPEDIC SURGERY | Facility: CLINIC | Age: 77
End: 2022-05-02
Payer: MEDICARE

## 2022-05-02 PROCEDURE — 99214 OFFICE O/P EST MOD 30 MIN: CPT

## 2022-05-02 NOTE — ASSESSMENT
[FreeTextEntry1] : Patient will begin physical therapy. \par \par Recommend: - NSAID - Heating pad - Muscle relaxer - Neck stretching exercise - Soft cervical collar - Cervical traction Patient is given neck rehabilitation exercise book. \par \par Recommend: - NSAID - Heating pad - Muscle relaxer - Core strengthening exercise - Hamstring stretching exercise Patient is given back rehabilitation exercise book. \par \par Follow up in 2 months \par \par Patient given referral to IR for right hip injection

## 2022-05-02 NOTE — REASON FOR VISIT
[FreeTextEntry2] : Neck and back pain ongoing for years with NKI\par hx of scoliosis surgery in 1958.

## 2022-05-02 NOTE — HISTORY OF PRESENT ILLNESS
[de-identified] : F/U neck and back MRI \par Pt reports little change since last visit\par Reports pain radiating into right side of groin \par Reports going to PT after last visit with improvement- states she stopped in order to save sessions \par Reports taking Naproxen prn with relief.

## 2022-05-02 NOTE — DATA REVIEWED
[MRI] : MRI [Cervical Spine] : cervical spine [Lumbar Spine] : lumbar spine [Report was reviewed and noted in the chart] : The report was reviewed and noted in the chart [I independently reviewed and interpreted images and report] : I independently reviewed and interpreted images and report [FreeTextEntry1] : Multilevel cervical spondylosis \par Stenosis C4-7\par Left HNP C6-7 \par \par Multilevel lumbar spondylosis \par Severe thoracolumbar scoliosis

## 2022-05-03 ENCOUNTER — NON-APPOINTMENT (OUTPATIENT)
Age: 77
End: 2022-05-03

## 2022-05-11 ENCOUNTER — NON-APPOINTMENT (OUTPATIENT)
Age: 77
End: 2022-05-11

## 2022-05-12 ENCOUNTER — APPOINTMENT (OUTPATIENT)
Dept: CARDIOLOGY | Facility: CLINIC | Age: 77
End: 2022-05-12
Payer: MEDICARE

## 2022-05-12 ENCOUNTER — NON-APPOINTMENT (OUTPATIENT)
Age: 77
End: 2022-05-12

## 2022-05-12 PROCEDURE — G2066: CPT

## 2022-05-12 PROCEDURE — 93298 REM INTERROG DEV EVAL SCRMS: CPT

## 2022-05-17 ENCOUNTER — APPOINTMENT (OUTPATIENT)
Dept: CARDIOLOGY | Facility: CLINIC | Age: 77
End: 2022-05-17
Payer: MEDICARE

## 2022-05-17 ENCOUNTER — NON-APPOINTMENT (OUTPATIENT)
Age: 77
End: 2022-05-17

## 2022-05-17 VITALS
BODY MASS INDEX: 25.4 KG/M2 | TEMPERATURE: 98.5 F | SYSTOLIC BLOOD PRESSURE: 140 MMHG | DIASTOLIC BLOOD PRESSURE: 72 MMHG | OXYGEN SATURATION: 98 % | HEIGHT: 62 IN | WEIGHT: 138 LBS | HEART RATE: 82 BPM

## 2022-05-17 DIAGNOSIS — E03.9 HYPOTHYROIDISM, UNSPECIFIED: ICD-10-CM
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
## 2022-05-17 PROCEDURE — 99214 OFFICE O/P EST MOD 30 MIN: CPT

## 2022-05-17 PROCEDURE — 93000 ELECTROCARDIOGRAM COMPLETE: CPT

## 2022-05-17 RX ORDER — OMEPRAZOLE 20 MG/1
20 CAPSULE, DELAYED RELEASE ORAL
Qty: 90 | Refills: 0 | Status: DISCONTINUED | COMMUNITY
Start: 2021-09-13 | End: 2022-05-17

## 2022-05-17 RX ORDER — ATORVASTATIN CALCIUM 40 MG/1
40 TABLET, FILM COATED ORAL
Qty: 90 | Refills: 3 | Status: DISCONTINUED | COMMUNITY
End: 2022-05-17

## 2022-05-17 NOTE — CARDIOLOGY SUMMARY
[No Ischemia] : no Ischemia [No Exercise Ind Arr] : no exercise induced arrhythmias [No Symptoms] : no Symptoms [LVEF ___%] : LVEF [unfilled]% [___] : [unfilled] [Mild] : mild LV dysfunction [None] : no pulmonary hypertension [Normal] : normal LA size [de-identified] : 5/17/22 nsr [de-identified] : September 17, 2020 EF 60 to 65% mild mitral regurgitation normal left atrium

## 2022-05-17 NOTE — DISCUSSION/SUMMARY
[Patient] : the patient [Risks] : risks [Benefits] : benefits [Alternatives] : alternatives [FreeTextEntry1] : 76 year-old female with history of hypertension, AVNRT status post ablation, and presence of paroxysmal SVT or paroxysmal atrial fibrillation, status post implantable loop recorder without any significant complications or evidence of atrial fibrillation.  Being followed regularly in the office.\par Left anterior descending artery was noted to be aneurysmal without any significant obstructive disease.\par 2019 nonischemic perfusion scan.\par echocardiogram had shown an ejection fraction 65% ascending aorta was 3.4 cm, mitral insufficiency.\par Continue aspirin.  Continue statin therapy.\par Continue Cardizem.\par Aspirin 81 mg to be taken every other day due to increased ecchymosis and easy bruising. After taking it daily. It is important in presence of coronary aneurysm. Continue diltiazem.\par \par Significant fatigue and tiredness.  N-terminal proBNP is normal.  CBC CMP stable.  Elevated TSH and abnormal Lyme titers probably contributing factors.\par Management as per your office.\par \par Hypertension, blood pressure controlled. Continue current medication. Low sodium diet.  Prescription done\par \par Hyperlipidemia, continue atorvastatin.  Increase dose to 40 mg.  Try to aim LDL less than 70.  Risk benefits alternatives reviewed.  Lifestyle and risk factor modification.\par \par Shortness of breath,Pulmonary nodules.  No significant improvement since\par She has severe kyphoscoliosis probably the reason with restrictive lung disease.  Continue follow-up with pulmonology\par \par \par Counseling regarding low saturated fat, salt and carbohydrate intake was reviewed. Active lifestyle and regular. Exercise along with weight management is advised.\par All the above were at length reviewed. Answered all the questions. Thank you very much for this kind referral. Please do not hesitate to give me a call for any question.\par Part of this transcription was done with voice recognition software and phonetically similar errors are common. I apologize for that. Please donot hesitate to call for any questions due to above.\par \par Sincerely,\par Carmen Santiago MD,FAC,[facility]\par

## 2022-05-17 NOTE — REASON FOR VISIT
[FreeTextEntry3] : Dr. Madison Santiago [FreeTextEntry1] : 76-year-old female comes in for follow-up consultation. she still has continued dyspnea on exertion. A mild to moderate level of workload.  No complaint of chest pain.  No PND, orthopnea, pedal edema. No cough, fever, or chills. Often related to anxiety. It is nonprogressive.  She is on inhaler therapy\par She has also significant continued fatigue and tiredness.  Recent labs showed possible Lyme disease and hypothyroidism.  She is being treated with doxycycline and low-dose of levothyroxine at 25 mcg.\par No chest pain.\par She denies any , palpitation, dizziness, or syncopal episode.\par Her anxiety has significantly improved\par No bleeding complications. \par

## 2022-05-17 NOTE — ASSESSMENT
[FreeTextEntry1] : \par Labs March 20, 2018. CBC was stable. Creatinine 2.68 sodium 141, potassium 3.9, TSH 3.3. pro BNP 98.\par Echocardiogram 1/30/18 EF 60%\par Nuclear stress test 1/30/18 No evidence of ischemia\par \par Reviewed on May 16, 2019.\par Hospital information reviewed, which includes evaluation nodes. Labs chest x-ray, EKG. CT scan of the chest and abdomen ordered by gastroenterologist\par \par Reviewed on January 22, 2020\par Nuclear myocardial perfusion scan pharmacological June 11, 2019 was nonischemic\par Echocardiogram February 12, 2019 EF 65% mild mitral regurgitation.  Normal pulmonary pressures.\par Implantable loop recorder so far no significant atrial fibrillation episodes\par \par Reviewed on August 25, 2020.\par EKG August 25, 2020.  Indication history of paroxysmal atrial fibrillation PSVT.  Interpretation normal sinus rhythm low voltage complexes.\par Implantable loop recorder readings for the last few months reviewed.  No episode of atrial fibrillation.\par Labs from [facility] hospital admission requested\par \par Reviewed on February 7, 2022.\par Labs from November 2021 were reviewed.  Stable CBC except mild normocytic anemia, creatinine 0.8 potassium 4.1 sodium 147 LFT normal  HDL 75 triglycerides 73 N-terminal proBNP 125 Lyme titers overall negative\par Implantable loop recorder readings were discussed.\par \par Reviewed on May 17, 2022\par Labs May 9, 2022.  Normal CBC.  Sodium 143 potassium 3.7 creatinine 0.85 LFT otherwise normal total cholesterol 199 triglycerides 105 HDL 68  TSH 14.16\par N-terminal proBNP 137 Lyme titers abnormal

## 2022-06-01 ENCOUNTER — APPOINTMENT (OUTPATIENT)
Dept: OPHTHALMOLOGY | Facility: CLINIC | Age: 77
End: 2022-06-01

## 2022-06-16 ENCOUNTER — NON-APPOINTMENT (OUTPATIENT)
Age: 77
End: 2022-06-16

## 2022-06-16 ENCOUNTER — APPOINTMENT (OUTPATIENT)
Dept: CARDIOLOGY | Facility: CLINIC | Age: 77
End: 2022-06-16
Payer: MEDICARE

## 2022-06-16 PROCEDURE — 93298 REM INTERROG DEV EVAL SCRMS: CPT

## 2022-06-16 PROCEDURE — G2066: CPT

## 2022-06-22 ENCOUNTER — NON-APPOINTMENT (OUTPATIENT)
Age: 77
End: 2022-06-22

## 2022-06-28 ENCOUNTER — NON-APPOINTMENT (OUTPATIENT)
Age: 77
End: 2022-06-28

## 2022-07-11 ENCOUNTER — APPOINTMENT (OUTPATIENT)
Dept: ORTHOPEDIC SURGERY | Facility: CLINIC | Age: 77
End: 2022-07-11

## 2022-07-15 ENCOUNTER — APPOINTMENT (OUTPATIENT)
Dept: ORTHOPEDIC SURGERY | Facility: CLINIC | Age: 77
End: 2022-07-15

## 2022-07-17 ENCOUNTER — FORM ENCOUNTER (OUTPATIENT)
Age: 77
End: 2022-07-17

## 2022-07-18 ENCOUNTER — APPOINTMENT (OUTPATIENT)
Dept: ORTHOPEDIC SURGERY | Facility: CLINIC | Age: 77
End: 2022-07-18

## 2022-07-18 VITALS — BODY MASS INDEX: 25.4 KG/M2 | WEIGHT: 138 LBS | HEIGHT: 62 IN

## 2022-07-18 DIAGNOSIS — M62.838 OTHER MUSCLE SPASM: ICD-10-CM

## 2022-07-18 DIAGNOSIS — M47.22 OTHER SPONDYLOSIS WITH RADICULOPATHY, CERVICAL REGION: ICD-10-CM

## 2022-07-18 DIAGNOSIS — M47.812 SPONDYLOSIS W/OUT MYELOPATHY OR RADICULOPATHY, CERVICAL REGION: ICD-10-CM

## 2022-07-18 DIAGNOSIS — M48.02 SPINAL STENOSIS, CERVICAL REGION: ICD-10-CM

## 2022-07-18 PROCEDURE — 20552 NJX 1/MLT TRIGGER POINT 1/2: CPT

## 2022-07-18 PROCEDURE — 99214 OFFICE O/P EST MOD 30 MIN: CPT | Mod: 25

## 2022-07-18 NOTE — ASSESSMENT
[FreeTextEntry1] : Patient will begin physical therapy. \par \par Recommend: - NSAID - Heating pad - Muscle relaxer - Neck stretching exercise - Soft cervical collar - Cervical traction Patient is given neck rehabilitation exercise book. \par \par Recommend: - NSAID - Heating pad - Muscle relaxer - Core strengthening exercise - Hamstring stretching exercise Patient is given back rehabilitation exercise book. \par \par Referral to pain management for injections, follow up 2 weeks after injection.

## 2022-07-18 NOTE — HISTORY OF PRESENT ILLNESS
[de-identified] : Follow up cervical spine. Experiencing severe pain for the past 5 days. Pain is on the left side radiating up into the head and down into the shoulder. Spoke with Leno over the weekend. Taking Medrol and Methocarbamol.

## 2022-07-18 NOTE — PROCEDURE
[Cervical paraspinal muscle] : cervical paraspinal muscle [] : Patient tolerated procedure well [Trigger point 1-2 muscle groups] : trigger point 1-2 muscle groups [Left] : of the left [Pain] : pain [Inflammation] : inflammation [Betadine] : betadine [Ethyl Chloride sprayed topically] : ethyl chloride sprayed topically [Sterile technique used] : sterile technique used [___ cc    1%] : Lidocaine ~Vcc of 1%  [___ cc    40mg] : Triamcinolone (Kenalog) ~Vcc of 40 mg  [Call if redness, pain or fever occur] : call if redness, pain or fever occur [Apply ice for 15min out of every hour for the next 12-24 hours as tolerated] : apply ice for 15 minutes out of every hour for the next 12-24 hours as tolerated [Patient was advised to rest the joint(s) for ____ days] : patient was advised to rest the joint(s) for [unfilled] days [Previous OTC use and PT nontherapeutic] : patient has tried OTC's including aspirin, Ibuprofen, Aleve, etc or prescription NSAIDS, and/or exercises at home and/or physical therapy without satisfactory response [Risks, benefits, alternatives discussed / Verbal consent obtained] : the risks benefits, and alternatives have been discussed, and verbal consent was obtained

## 2022-07-21 ENCOUNTER — NON-APPOINTMENT (OUTPATIENT)
Age: 77
End: 2022-07-21

## 2022-07-21 ENCOUNTER — APPOINTMENT (OUTPATIENT)
Dept: CARDIOLOGY | Facility: CLINIC | Age: 77
End: 2022-07-21

## 2022-07-21 PROCEDURE — 93298 REM INTERROG DEV EVAL SCRMS: CPT

## 2022-07-21 PROCEDURE — G2066: CPT

## 2022-07-22 ENCOUNTER — APPOINTMENT (OUTPATIENT)
Dept: PODIATRY | Facility: CLINIC | Age: 77
End: 2022-07-22

## 2022-07-22 VITALS — HEIGHT: 62 IN | BODY MASS INDEX: 25.4 KG/M2 | WEIGHT: 138 LBS

## 2022-07-22 DIAGNOSIS — M25.471 EFFUSION, RIGHT ANKLE: ICD-10-CM

## 2022-07-22 PROCEDURE — 73610 X-RAY EXAM OF ANKLE: CPT | Mod: RT

## 2022-07-22 PROCEDURE — 99203 OFFICE O/P NEW LOW 30 MIN: CPT

## 2022-07-22 NOTE — REASON FOR VISIT
[FreeTextEntry2] : PATIENT IS HERE FOR RIGHT ANKLE SWELLING REFER BY DR ANGELIA UNGER  TWO MONTHS RIGHT ANKLE HAS BEEN SWOOLEN.  TOLD TO GET AN X RAY FROM DR UNGER. NO PAIN.

## 2022-07-22 NOTE — PHYSICAL EXAM
[NL 30)] : inversion 30 degrees [NL (40)] : MTP joint DF 40 degrees [NL (20)] : MTP joint PF 20 degrees [5___] : [facility] 5[unfilled]/5 [1+] : posterior tibialis pulse: 1+ [Normal] : saphenous nerve sensation normal [Right] : right ankle [There are no fractures, subluxations or dislocations. No significant abnormalities are seen] : There are no fractures, subluxations or dislocations. No significant abnormalities are seen [] : not mildly antalgic [FreeTextEntry3] : NONE

## 2022-07-22 NOTE — PHYSICAL EXAM
[NL 30)] : inversion 30 degrees [NL (40)] : MTP joint DF 40 degrees [NL (20)] : MTP joint PF 20 degrees [5___] : [facility] 5[unfilled]/5 [1+] : posterior tibialis pulse: 1+ [Normal] : saphenous nerve sensation normal [Right] : right ankle [There are no fractures, subluxations or dislocations. No significant abnormalities are seen] : There are no fractures, subluxations or dislocations. No significant abnormalities are seen [] : not mildly antalgic [FreeTextEntry3] : NONE

## 2022-08-05 ENCOUNTER — NON-APPOINTMENT (OUTPATIENT)
Age: 77
End: 2022-08-05

## 2022-08-05 ENCOUNTER — APPOINTMENT (OUTPATIENT)
Dept: OPHTHALMOLOGY | Facility: CLINIC | Age: 77
End: 2022-08-05

## 2022-08-05 PROCEDURE — 92014 COMPRE OPH EXAM EST PT 1/>: CPT

## 2022-08-17 ENCOUNTER — FORM ENCOUNTER (OUTPATIENT)
Age: 77
End: 2022-08-17

## 2022-08-24 ENCOUNTER — RX RENEWAL (OUTPATIENT)
Age: 77
End: 2022-08-24

## 2022-08-25 ENCOUNTER — APPOINTMENT (OUTPATIENT)
Dept: CARDIOLOGY | Facility: CLINIC | Age: 77
End: 2022-08-25

## 2022-08-25 ENCOUNTER — NON-APPOINTMENT (OUTPATIENT)
Age: 77
End: 2022-08-25

## 2022-08-25 PROCEDURE — 93298 REM INTERROG DEV EVAL SCRMS: CPT

## 2022-08-25 PROCEDURE — G2066: CPT

## 2022-08-30 ENCOUNTER — APPOINTMENT (OUTPATIENT)
Dept: PODIATRY | Facility: CLINIC | Age: 77
End: 2022-08-30

## 2022-09-29 ENCOUNTER — APPOINTMENT (OUTPATIENT)
Dept: CARDIOLOGY | Facility: CLINIC | Age: 77
End: 2022-09-29

## 2022-09-29 ENCOUNTER — NON-APPOINTMENT (OUTPATIENT)
Age: 77
End: 2022-09-29

## 2022-09-29 PROCEDURE — 93298 REM INTERROG DEV EVAL SCRMS: CPT

## 2022-09-29 PROCEDURE — G2066: CPT

## 2022-10-31 ENCOUNTER — APPOINTMENT (OUTPATIENT)
Dept: CARDIOLOGY | Facility: CLINIC | Age: 77
End: 2022-10-31

## 2022-10-31 ENCOUNTER — NON-APPOINTMENT (OUTPATIENT)
Age: 77
End: 2022-10-31

## 2022-10-31 VITALS
HEIGHT: 62 IN | DIASTOLIC BLOOD PRESSURE: 68 MMHG | SYSTOLIC BLOOD PRESSURE: 124 MMHG | OXYGEN SATURATION: 98 % | HEART RATE: 89 BPM | BODY MASS INDEX: 23.92 KG/M2 | WEIGHT: 130 LBS

## 2022-10-31 DIAGNOSIS — Z98.890 OTHER SPECIFIED POSTPROCEDURAL STATES: ICD-10-CM

## 2022-10-31 DIAGNOSIS — R00.2 PALPITATIONS: ICD-10-CM

## 2022-10-31 PROCEDURE — 93285 PRGRMG DEV EVAL SCRMS IP: CPT

## 2022-10-31 PROCEDURE — 93000 ELECTROCARDIOGRAM COMPLETE: CPT | Mod: XE

## 2022-10-31 PROCEDURE — 99214 OFFICE O/P EST MOD 30 MIN: CPT

## 2022-10-31 RX ORDER — FLUTICASONE PROPIONATE 110 UG/1
110 AEROSOL, METERED RESPIRATORY (INHALATION)
Qty: 12 | Refills: 0 | Status: ACTIVE | COMMUNITY
Start: 2022-08-22

## 2022-10-31 RX ORDER — INHALER, ASSIST DEVICES
SPACER (EA) MISCELLANEOUS
Qty: 1 | Refills: 0 | Status: DISCONTINUED | COMMUNITY
Start: 2021-12-14 | End: 2022-10-31

## 2022-10-31 RX ORDER — NAPROXEN 500 MG/1
500 TABLET ORAL
Qty: 60 | Refills: 0 | Status: DISCONTINUED | COMMUNITY
Start: 2022-05-02 | End: 2022-10-31

## 2022-10-31 RX ORDER — DILTIAZEM HYDROCHLORIDE 120 MG/1
120 CAPSULE, EXTENDED RELEASE ORAL
Qty: 180 | Refills: 3 | Status: DISCONTINUED | COMMUNITY
Start: 2018-09-26 | End: 2022-10-31

## 2022-10-31 RX ORDER — METHYLPREDNISOLONE 4 MG/1
4 TABLET ORAL
Qty: 1 | Refills: 0 | Status: DISCONTINUED | COMMUNITY
Start: 2022-07-16 | End: 2022-10-31

## 2022-10-31 RX ORDER — METHOCARBAMOL 750 MG/1
750 TABLET, FILM COATED ORAL 3 TIMES DAILY
Qty: 90 | Refills: 0 | Status: DISCONTINUED | COMMUNITY
Start: 2022-07-16 | End: 2022-10-31

## 2022-10-31 RX ORDER — CYCLOBENZAPRINE HYDROCHLORIDE 5 MG/1
5 TABLET, FILM COATED ORAL 3 TIMES DAILY
Qty: 60 | Refills: 0 | Status: DISCONTINUED | COMMUNITY
Start: 2022-07-18 | End: 2022-10-31

## 2022-10-31 RX ORDER — DOXYCYCLINE HYCLATE 100 MG/1
100 CAPSULE ORAL
Qty: 28 | Refills: 0 | Status: DISCONTINUED | COMMUNITY
Start: 2021-11-03 | End: 2022-10-31

## 2022-10-31 NOTE — PROCEDURE
[de-identified] : MILDRED [de-identified] : SURAJ [de-identified] : [id number] [de-identified] : 6/16/2021 [de-identified] : Tachy - ON - >150bpm\par Deyvi - ON - <30bp,\par Pause - ON - >3sec\par \par No new recorded events\par OV with Dr. Santiago today\par Continue to monitor\par \par Red flag symptoms which would warrant sooner emergent evaluation reviewed with the patient. \par Questions and concerns were addressed and answered. \par \par Sincerely,\par \par Shannan Neri PA-C\par Patients history, testing and plan reviewed with supervising MD: Dr. Carmen Santiago

## 2022-10-31 NOTE — REVIEW OF SYSTEMS
[Feeling Fatigued] : feeling fatigued [SOB] : shortness of breath [Dyspnea on exertion] : dyspnea during exertion [Joint Pain] : joint pain [Joint Stiffness] : joint stiffness [Anxiety] : anxiety [Under Stress] : under stress [Negative] : Heme/Lymph [Palpitations] : palpitations

## 2022-10-31 NOTE — DISCUSSION/SUMMARY
[Patient] : the patient [Risks] : risks [Benefits] : benefits [Alternatives] : alternatives [FreeTextEntry1] : 77-year-old female with above medical history active medical problems as noted below\par \par Intermittent palpitation with activity since starting new medication for urinary symptoms.  Event monitor/implantable loop recorder reviewed.  No significant atrial fibrillation or arrhythmias noted.\par Blood pressure and clinical examination stable\par Recent CBC CMP stable\par Recently started also on Synthroid\par Check TSH and BNP level\par Ask her to stop Myrbetriq if possible after talking to urologist.  If improved symptoms then consider use of different medications.  If no improved symptoms would recommend her to have event monitor/echocardiogram and further evaluation regarding her intermittent palpitations.  And may need to increase diltiazem again to twice daily.\par \par  AVNRT status post ablation, and presence of paroxysmal SVT or paroxysmal atrial fibrillation, status post implantable loop recorder without any significant complications or evidence of atrial fibrillation.  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 After taking it daily. It is important in presence of coronary aneurysm. Continue diltiazem.\par \par Hypertension, blood pressure controlled. Continue current medication. Low sodium diet.  Prescription done\par \par Hyperlipidemia, continue atorvastatin.  Increase dose to 40 mg.  Try to aim LDL less than 70.  Risk benefits alternatives reviewed.  Lifestyle and risk factor modification.\par \par Shortness of breath,Pulmonary nodules.  Normal BNP level.\par She has severe kyphoscoliosis probably the reason with restrictive lung disease.  Continue follow-up with pulmonology\par \par \par Counseling regarding low saturated fat, salt and carbohydrate intake was reviewed. Active lifestyle and regular. Exercise along with weight management is advised.\par All the above were at length reviewed. Answered all the questions. Thank you very much for this kind referral. Please do not hesitate to give me a call for any question.\par Part of this transcription was done with voice recognition software and phonetically similar errors are common. I apologize for that. Please donot hesitate to call for any questions due to above.\par \par Sincerely,\par Carmen Santiago MD,FACC,FASE\par

## 2022-10-31 NOTE — REASON FOR VISIT
[Spouse] : spouse [FreeTextEntry3] : Dr. Madison Santiago [FreeTextEntry1] : 77-year-old female comes in for urgent evaluation with complaint of palpitations and racing heartbeats after walking short distance since starting Myrbetriq.  Her loop recorder was evaluated with did not show any significant atrial fibrillation or atrial tachycardia.\par No near syncopal or syncopal event. she still has continued dyspnea on exertion. A mild to moderate level of workload.  No complaint of chest pain.  No PND, orthopnea, pedal edema. No cough, fever, or chills. Often related to anxiety. It is nonprogressive.  She is on inhaler therapy\par She has also significant continued fatigue and tiredness.  Recent labs showed possible Lyme disease\par Her anxiety has significantly improved\par No bleeding complications. \par

## 2022-10-31 NOTE — CARDIOLOGY SUMMARY
[No Ischemia] : no Ischemia [No Exercise Ind Arr] : no exercise induced arrhythmias [No Symptoms] : no Symptoms [LVEF ___%] : LVEF [unfilled]% [___] : [unfilled] [Mild] : mild LV dysfunction [None] : no pulmonary hypertension [Normal] : normal LA size [de-identified] : 5/17/22 nsr\par October 31, 2022 normal sinus rhythm possible old inferior infarct nonspecific T wave changes low voltage [de-identified] : September 17, 2020 EF 60 to 65% mild mitral regurgitation normal left atrium

## 2022-10-31 NOTE — PHYSICAL EXAM
[General Appearance - Well Developed] : well developed [Normal Appearance] : normal appearance [Well Groomed] : well groomed [General Appearance - Well Nourished] : well nourished [No Deformities] : no deformities [General Appearance - In No Acute Distress] : no acute distress [] : no respiratory distress [Respiration, Rhythm And Depth] : normal respiratory rhythm and effort [Auscultation Breath Sounds / Voice Sounds] : lungs were clear to auscultation bilaterally [Heart Rate And Rhythm] : heart rate and rhythm were normal [Heart Sounds] : normal S1 and S2 [Arterial Pulses Normal] : the arterial pulses were normal [Edema] : no peripheral edema present [Nail Clubbing] : no clubbing of the fingernails [Cyanosis, Localized] : no localized cyanosis [Oriented To Time, Place, And Person] : oriented to person, place, and time [Impaired Insight] : insight and judgment were intact [Affect] : the affect was normal [FreeTextEntry1] : kyphoscoliosis

## 2022-10-31 NOTE — ASSESSMENT
[FreeTextEntry1] : \par Labs March 20, 2018. CBC was stable. Creatinine 2.68 sodium 141, potassium 3.9, TSH 3.3. pro BNP 98.\par Echocardiogram 1/30/18 EF 60%\par Nuclear stress test 1/30/18 No evidence of ischemia\par \par Reviewed on May 16, 2019.\par Hospital information reviewed, which includes evaluation nodes. Labs chest x-ray, EKG. CT scan of the chest and abdomen ordered by gastroenterologist\par \par Reviewed on January 22, 2020\par Nuclear myocardial perfusion scan pharmacological June 11, 2019 was nonischemic\par Echocardiogram February 12, 2019 EF 65% mild mitral regurgitation.  Normal pulmonary pressures.\par Implantable loop recorder so far no significant atrial fibrillation episodes\par \par Reviewed on August 25, 2020.\par EKG August 25, 2020.  Indication history of paroxysmal atrial fibrillation PSVT.  Interpretation normal sinus rhythm low voltage complexes.\par Implantable loop recorder readings for the last few months reviewed.  No episode of atrial fibrillation.\par Labs from [facility] hospital admission requested\par \par Reviewed on February 7, 2022.\par Labs from November 2021 were reviewed.  Stable CBC except mild normocytic anemia, creatinine 0.8 potassium 4.1 sodium 147 LFT normal  HDL 75 triglycerides 73 N-terminal proBNP 125 Lyme titers overall negative\par Implantable loop recorder readings were discussed.\par \par Reviewed on May 17, 2022\par Labs May 9, 2022.  Normal CBC.  Sodium 143 potassium 3.7 creatinine 0.85 LFT otherwise normal total cholesterol 199 triglycerides 105 HDL 68  TSH 14.16\par N-terminal proBNP 137 Lyme titers abnormal\par \par Reviewed on October 31, 2022.\par EKG as noted above\par Labs from September 2022 stable CBC CMP.

## 2022-11-03 ENCOUNTER — APPOINTMENT (OUTPATIENT)
Dept: CARDIOLOGY | Facility: CLINIC | Age: 77
End: 2022-11-03

## 2022-11-03 ENCOUNTER — NON-APPOINTMENT (OUTPATIENT)
Age: 77
End: 2022-11-03

## 2022-11-03 PROCEDURE — 93298 REM INTERROG DEV EVAL SCRMS: CPT

## 2022-11-03 PROCEDURE — G2066: CPT

## 2022-11-16 ENCOUNTER — APPOINTMENT (OUTPATIENT)
Dept: CARDIOLOGY | Facility: CLINIC | Age: 77
End: 2022-11-16

## 2022-12-08 ENCOUNTER — NON-APPOINTMENT (OUTPATIENT)
Age: 77
End: 2022-12-08

## 2022-12-08 ENCOUNTER — APPOINTMENT (OUTPATIENT)
Dept: CARDIOLOGY | Facility: CLINIC | Age: 77
End: 2022-12-08

## 2022-12-08 PROCEDURE — 93298 REM INTERROG DEV EVAL SCRMS: CPT

## 2022-12-08 PROCEDURE — G2066: CPT

## 2022-12-28 ENCOUNTER — NON-APPOINTMENT (OUTPATIENT)
Age: 77
End: 2022-12-28

## 2023-01-12 ENCOUNTER — APPOINTMENT (OUTPATIENT)
Dept: CARDIOLOGY | Facility: CLINIC | Age: 78
End: 2023-01-12
Payer: MEDICARE

## 2023-01-12 ENCOUNTER — NON-APPOINTMENT (OUTPATIENT)
Age: 78
End: 2023-01-12

## 2023-01-12 PROCEDURE — 93298 REM INTERROG DEV EVAL SCRMS: CPT

## 2023-01-12 PROCEDURE — G2066: CPT

## 2023-01-17 ENCOUNTER — APPOINTMENT (OUTPATIENT)
Dept: PODIATRY | Facility: CLINIC | Age: 78
End: 2023-01-17

## 2023-02-16 ENCOUNTER — APPOINTMENT (OUTPATIENT)
Dept: CARDIOLOGY | Facility: CLINIC | Age: 78
End: 2023-02-16
Payer: MEDICARE

## 2023-02-16 ENCOUNTER — NON-APPOINTMENT (OUTPATIENT)
Age: 78
End: 2023-02-16

## 2023-02-16 PROCEDURE — 93298 REM INTERROG DEV EVAL SCRMS: CPT

## 2023-02-16 PROCEDURE — G2066: CPT

## 2023-03-09 ENCOUNTER — NON-APPOINTMENT (OUTPATIENT)
Age: 78
End: 2023-03-09

## 2023-03-23 ENCOUNTER — APPOINTMENT (OUTPATIENT)
Dept: CARDIOLOGY | Facility: CLINIC | Age: 78
End: 2023-03-23
Payer: MEDICARE

## 2023-03-23 ENCOUNTER — NON-APPOINTMENT (OUTPATIENT)
Age: 78
End: 2023-03-23

## 2023-03-23 PROCEDURE — 93298 REM INTERROG DEV EVAL SCRMS: CPT

## 2023-03-23 PROCEDURE — G2066: CPT

## 2023-03-29 ENCOUNTER — APPOINTMENT (OUTPATIENT)
Dept: CARDIOLOGY | Facility: CLINIC | Age: 78
End: 2023-03-29
Payer: MEDICARE

## 2023-03-29 VITALS
HEART RATE: 79 BPM | SYSTOLIC BLOOD PRESSURE: 114 MMHG | OXYGEN SATURATION: 94 % | WEIGHT: 125 LBS | DIASTOLIC BLOOD PRESSURE: 62 MMHG | HEIGHT: 62 IN | BODY MASS INDEX: 23 KG/M2

## 2023-03-29 DIAGNOSIS — G47.30 SLEEP APNEA, UNSPECIFIED: ICD-10-CM

## 2023-03-29 DIAGNOSIS — Z91.018 ALLERGY TO OTHER FOODS: ICD-10-CM

## 2023-03-29 PROCEDURE — 99214 OFFICE O/P EST MOD 30 MIN: CPT

## 2023-03-29 RX ORDER — ASPIRIN ENTERIC COATED TABLETS 81 MG 81 MG/1
81 TABLET, DELAYED RELEASE ORAL EVERY OTHER DAY
Refills: 0 | Status: ACTIVE | COMMUNITY
Start: 2018-03-29

## 2023-03-29 RX ORDER — MIRABEGRON 25 MG/1
25 TABLET, FILM COATED, EXTENDED RELEASE ORAL DAILY
Refills: 0 | Status: DISCONTINUED | COMMUNITY
End: 2023-03-29

## 2023-03-29 NOTE — CARDIOLOGY SUMMARY
[No Ischemia] : no Ischemia [No Exercise Ind Arr] : no exercise induced arrhythmias [No Symptoms] : no Symptoms [LVEF ___%] : LVEF [unfilled]% [___] : [unfilled] [Mild] : mild LV dysfunction [None] : no pulmonary hypertension [Normal] : normal LA size [de-identified] : 5/17/22 nsr\par October 31, 2022 normal sinus rhythm possible old inferior infarct nonspecific T wave changes low voltage [de-identified] : September 17, 2020 EF 60 to 65% mild mitral regurgitation normal left atrium\par February 21, 2022 EF 55 to 60% mild mitral regurgitation normal left atrium normal pulmonary pressures. [de-identified] : Loop recorder information reviewed.

## 2023-03-29 NOTE — DISCUSSION/SUMMARY
[Patient] : the patient [Risks] : risks [Benefits] : benefits [Alternatives] : alternatives [FreeTextEntry1] : 77-year-old female with above medical history active medical problems as noted below\par \par  AVNRT status post ablation, and presence of paroxysmal SVT or paroxysmal atrial fibrillation, status post implantable loop recorder without any significant complications or evidence of atrial fibrillation.  Being followed regularly in the office.  Not on any anticoagulation as per discussion with electrophysiologist.  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 After taking it daily. It is important in presence of coronary aneurysm. Continue diltiazem.\par \par Hypertension, blood pressure controlled. Continue current medication. Low sodium diet.  Prescription done\par Goal less than 130/80.  Clinically no CHF.  No CKD.  Non-smoker.\par \par Hyperlipidemia, continue atorvastatin.  Increase dose to 40 mg.  Try to aim LDL less than 70.  Risk benefits alternatives reviewed.  Lifestyle and risk factor modification.  Labs ordered.\par \par Shortness of breath,Pulmonary nodules.  Normal BNP level.\par She has severe kyphoscoliosis probably the reason with restrictive lung disease.  Continue follow-up with pulmonology\par \par \par \par Counseling regarding low saturated fat, salt and carbohydrate intake was reviewed. Active lifestyle and regular. Exercise along with weight management is advised.\par All the above were at length reviewed. Answered all the questions. Thank you very much for this kind referral. Please do not hesitate to give me a call for any question.\par Part of this transcription was done with voice recognition software and phonetically similar errors are common. I apologize for that. Please donot hesitate to call for any questions due to above.\par \par Sincerely,\par Carmen Santiago MD,FACC,JONN\par

## 2023-03-29 NOTE — ASSESSMENT
[FreeTextEntry1] : \par Labs March 20, 2018. CBC was stable. Creatinine 2.68 sodium 141, potassium 3.9, TSH 3.3. pro BNP 98.\par Echocardiogram 1/30/18 EF 60%\par Nuclear stress test 1/30/18 No evidence of ischemia\par \par Reviewed on May 16, 2019.\par Hospital information reviewed, which includes evaluation nodes. Labs chest x-ray, EKG. CT scan of the chest and abdomen ordered by gastroenterologist\par \par Reviewed on January 22, 2020\par Nuclear myocardial perfusion scan pharmacological June 11, 2019 was nonischemic\par Echocardiogram February 12, 2019 EF 65% mild mitral regurgitation.  Normal pulmonary pressures.\par Implantable loop recorder so far no significant atrial fibrillation episodes\par \par Reviewed on August 25, 2020.\par EKG August 25, 2020.  Indication history of paroxysmal atrial fibrillation PSVT.  Interpretation normal sinus rhythm low voltage complexes.\par Implantable loop recorder readings for the last few months reviewed.  No episode of atrial fibrillation.\par Labs from [facility] hospital admission requested\par \par Reviewed on February 7, 2022.\par Labs from November 2021 were reviewed.  Stable CBC except mild normocytic anemia, creatinine 0.8 potassium 4.1 sodium 147 LFT normal  HDL 75 triglycerides 73 N-terminal proBNP 125 Lyme titers overall negative\par Implantable loop recorder readings were discussed.\par \par Reviewed on May 17, 2022\par Labs May 9, 2022.  Normal CBC.  Sodium 143 potassium 3.7 creatinine 0.85 LFT otherwise normal total cholesterol 199 triglycerides 105 HDL 68  TSH 14.16\par N-terminal proBNP 137 Lyme titers abnormal\par \par Reviewed on October 31, 2022.\par EKG as noted above\par Labs from September 2022 stable CBC CMP.\par \par Reviewed on March 29, 2023.\par Most recent labs her TSH about 6.\par Echocardiogram from last year reviewed.\par Implantable loop recorder data reviewed.

## 2023-03-29 NOTE — REASON FOR VISIT
[Spouse] : spouse [FreeTextEntry3] : Dr. Madison Santiago [FreeTextEntry1] : 77-year-old female comes in with her  for follow-up consultation.  Since last seen her main complaint is shortness of breath which is stable.  She has also sleep apnea diagnosis with the use of CPAP for last 2 weeks.  She has not had any significant worsening palpitation, dizziness lightheadedness or near syncopal or syncopal event.\par No near syncopal or syncopal event. she still has continued dyspnea on exertion. A mild to moderate level of workload.  No complaint of chest pain.  No PND, orthopnea, pedal edema. No cough, fever, or chills. Often related to anxiety. It is nonprogressive.  She is on inhaler therapy\par She has also significant continued fatigue and tiredness.  Recent labs showed possible Lyme disease\par Her anxiety has significantly improved\par No bleeding complications. \par

## 2023-03-29 NOTE — REVIEW OF SYSTEMS
[Feeling Fatigued] : feeling fatigued [SOB] : shortness of breath [Dyspnea on exertion] : dyspnea during exertion [Palpitations] : palpitations [Joint Pain] : joint pain [Joint Stiffness] : joint stiffness [Anxiety] : anxiety [Under Stress] : under stress [Negative] : Heme/Lymph

## 2023-04-27 ENCOUNTER — NON-APPOINTMENT (OUTPATIENT)
Age: 78
End: 2023-04-27

## 2023-04-27 ENCOUNTER — APPOINTMENT (OUTPATIENT)
Dept: CARDIOLOGY | Facility: CLINIC | Age: 78
End: 2023-04-27
Payer: MEDICARE

## 2023-04-27 PROCEDURE — 93298 REM INTERROG DEV EVAL SCRMS: CPT

## 2023-04-27 PROCEDURE — G2066: CPT

## 2023-06-01 ENCOUNTER — APPOINTMENT (OUTPATIENT)
Dept: CARDIOLOGY | Facility: CLINIC | Age: 78
End: 2023-06-01
Payer: MEDICARE

## 2023-06-01 ENCOUNTER — NON-APPOINTMENT (OUTPATIENT)
Age: 78
End: 2023-06-01

## 2023-06-01 PROCEDURE — 93298 REM INTERROG DEV EVAL SCRMS: CPT

## 2023-06-01 PROCEDURE — G2066: CPT

## 2023-06-19 RX ORDER — NAPROXEN 500 MG/1
500 TABLET ORAL
Qty: 60 | Refills: 0 | Status: ACTIVE | COMMUNITY
Start: 2023-06-19 | End: 1900-01-01

## 2023-06-26 ENCOUNTER — NON-APPOINTMENT (OUTPATIENT)
Age: 78
End: 2023-06-26

## 2023-07-01 ENCOUNTER — NON-APPOINTMENT (OUTPATIENT)
Age: 78
End: 2023-07-01

## 2023-07-06 ENCOUNTER — APPOINTMENT (OUTPATIENT)
Dept: CARDIOLOGY | Facility: CLINIC | Age: 78
End: 2023-07-06
Payer: MEDICARE

## 2023-07-06 ENCOUNTER — NON-APPOINTMENT (OUTPATIENT)
Age: 78
End: 2023-07-06

## 2023-07-06 PROCEDURE — 93298 REM INTERROG DEV EVAL SCRMS: CPT

## 2023-07-06 PROCEDURE — G2066: CPT

## 2023-08-09 ENCOUNTER — APPOINTMENT (OUTPATIENT)
Dept: OPHTHALMOLOGY | Facility: CLINIC | Age: 78
End: 2023-08-09
Payer: MEDICARE

## 2023-08-09 ENCOUNTER — NON-APPOINTMENT (OUTPATIENT)
Age: 78
End: 2023-08-09

## 2023-08-09 PROCEDURE — 92014 COMPRE OPH EXAM EST PT 1/>: CPT

## 2023-08-10 ENCOUNTER — NON-APPOINTMENT (OUTPATIENT)
Age: 78
End: 2023-08-10

## 2023-08-10 ENCOUNTER — APPOINTMENT (OUTPATIENT)
Dept: CARDIOLOGY | Facility: CLINIC | Age: 78
End: 2023-08-10
Payer: MEDICARE

## 2023-08-10 PROCEDURE — G2066: CPT

## 2023-08-10 PROCEDURE — 93298 REM INTERROG DEV EVAL SCRMS: CPT

## 2023-08-28 ENCOUNTER — APPOINTMENT (OUTPATIENT)
Dept: ORTHOPEDIC SURGERY | Facility: CLINIC | Age: 78
End: 2023-08-28
Payer: MEDICARE

## 2023-08-28 VITALS — HEIGHT: 62 IN | BODY MASS INDEX: 23 KG/M2 | WEIGHT: 125 LBS

## 2023-08-28 PROCEDURE — 73522 X-RAY EXAM HIPS BI 3-4 VIEWS: CPT

## 2023-08-28 PROCEDURE — 72100 X-RAY EXAM L-S SPINE 2/3 VWS: CPT

## 2023-08-28 PROCEDURE — 99214 OFFICE O/P EST MOD 30 MIN: CPT

## 2023-08-28 RX ORDER — MELOXICAM 15 MG/1
15 TABLET ORAL DAILY
Qty: 30 | Refills: 1 | Status: ACTIVE | COMMUNITY
Start: 2023-08-28 | End: 1900-01-01

## 2023-08-28 NOTE — HISTORY OF PRESENT ILLNESS
[de-identified] : Follow up lumbar spine. Patient states she had a right hip CSI on 6/26/23 with short term relief. Patient states she feels pain radiating into bilateral hips. Patient states she has severe right hip pain when driving. Patient admits to taking Naproxen PRN for pain.  Today's Pain 5/10

## 2023-08-28 NOTE — PHYSICAL EXAM
[Right] : right hip [Left] : left hip [NL (45)] : external rotation 45 degrees [de-identified] : external rotation 30 degrees [TWNoteComboBox6] : internal rotation 30 degrees [TextEntry] : Constitutional: Well groomed and developed.  Respiratory: Normal, unlabored breathing. No use of accessory muscles.  Skin: No rashes or ulcers. Skin warm and dry.  Psychiatric: Oriented to time, place, person and event. No acute distress. Gait: Heel to toe Patient able to walk on toes and heels.    Lumbar spine:  Posture: Normal on coronal and sagittal alignment  AROM:  Flexion 60  Extension 10  Moderate pain with simulated truncal motion   Tenderness:  Thoracic: No tenderness on palpation  Lumbar: Moderate tenderness on palpation  Sacrum/coccyx: no tenderness on palpation  Greater trochanteric bursa:  No tenderness  PSIS: None    Motor:                         R             L LE:                                IS                    5             5 Quad              5             5 TA                  5             5 EHL                5             5 Gastroc         5             5                 R  L DTR: Patella  2+  2+ Achilles  2+  2+  Sensory: Light touch sensation intact T12-S1  Babinski's Sign: Negative bilaterally  Straight leg raise test: Negative bilaterally  JANINA test: negative bilaterally

## 2023-08-28 NOTE — ASSESSMENT
[FreeTextEntry1] : Patient can continue HEP  Prescription given today for LSO brace for support while ambulating  Mobic prescription given today  Referral to Interventional radiology for Intraarticular injection of Right hip  Recommend: - NSAID - Heating pad - Muscle relaxer - Neck stretching exercise - Soft cervical collar - Cervical traction Patient is given neck rehabilitation exercise book.   Recommend: - NSAID - Heating pad - Muscle relaxer - Core strengthening exercise - Hamstring stretching exercise Patient is given back rehabilitation exercise book.   Referral to pain management for injections, follow up 2 weeks after injection.  Follow up PRN

## 2023-08-28 NOTE — PHYSICAL EXAM
[Right] : right hip [Left] : left hip [NL (45)] : external rotation 45 degrees [de-identified] : external rotation 30 degrees [TWNoteComboBox6] : internal rotation 30 degrees [TextEntry] : Constitutional: Well groomed and developed.  Respiratory: Normal, unlabored breathing. No use of accessory muscles.  Skin: No rashes or ulcers. Skin warm and dry.  Psychiatric: Oriented to time, place, person and event. No acute distress. Gait: Heel to toe Patient able to walk on toes and heels.    Lumbar spine:  Posture: Normal on coronal and sagittal alignment  AROM:  Flexion 60  Extension 10  Moderate pain with simulated truncal motion   Tenderness:  Thoracic: No tenderness on palpation  Lumbar: Moderate tenderness on palpation  Sacrum/coccyx: no tenderness on palpation  Greater trochanteric bursa:  No tenderness  PSIS: None    Motor:                         R             L LE:                                IS                    5             5 Quad              5             5 TA                  5             5 EHL                5             5 Gastroc         5             5                 R  L DTR: Patella  2+  2+ Achilles  2+  2+  Sensory: Light touch sensation intact T12-S1  Babinski's Sign: Negative bilaterally  Straight leg raise test: Negative bilaterally  JANINA test: negative bilaterally

## 2023-08-28 NOTE — IMAGING
[Disc space narrowing] : Disc space narrowing [Scoliosis] : Scoliosis [Bilateral] : hip with pelvis bilaterally [AP] : anteroposterior [Lateral] : lateral [FreeTextEntry9] : Severe DJD R hip, Mild DJD L hip

## 2023-08-28 NOTE — HISTORY OF PRESENT ILLNESS
[de-identified] : Follow up lumbar spine. Patient states she had a right hip CSI on 6/26/23 with short term relief. Patient states she feels pain radiating into bilateral hips. Patient states she has severe right hip pain when driving. Patient admits to taking Naproxen PRN for pain.  Today's Pain 5/10

## 2023-09-14 ENCOUNTER — APPOINTMENT (OUTPATIENT)
Dept: CARDIOLOGY | Facility: CLINIC | Age: 78
End: 2023-09-14
Payer: MEDICARE

## 2023-09-14 ENCOUNTER — NON-APPOINTMENT (OUTPATIENT)
Age: 78
End: 2023-09-14

## 2023-09-14 PROCEDURE — 93298 REM INTERROG DEV EVAL SCRMS: CPT

## 2023-09-14 PROCEDURE — G2066: CPT

## 2023-09-20 ENCOUNTER — NON-APPOINTMENT (OUTPATIENT)
Age: 78
End: 2023-09-20

## 2023-09-20 ENCOUNTER — APPOINTMENT (OUTPATIENT)
Dept: CARDIOLOGY | Facility: CLINIC | Age: 78
End: 2023-09-20
Payer: MEDICARE

## 2023-09-20 VITALS
DIASTOLIC BLOOD PRESSURE: 62 MMHG | SYSTOLIC BLOOD PRESSURE: 128 MMHG | OXYGEN SATURATION: 96 % | HEIGHT: 62 IN | WEIGHT: 130 LBS | BODY MASS INDEX: 23.92 KG/M2 | HEART RATE: 86 BPM

## 2023-09-20 DIAGNOSIS — E78.41 ELEVATED LIPOPROTEIN(A): ICD-10-CM

## 2023-09-20 DIAGNOSIS — R06.02 SHORTNESS OF BREATH: ICD-10-CM

## 2023-09-20 DIAGNOSIS — I47.1 SUPRAVENTRICULAR TACHYCARDIA: ICD-10-CM

## 2023-09-20 PROCEDURE — 99214 OFFICE O/P EST MOD 30 MIN: CPT | Mod: 25

## 2023-09-20 PROCEDURE — 93000 ELECTROCARDIOGRAM COMPLETE: CPT

## 2023-09-20 RX ORDER — LEVOTHYROXINE SODIUM 0.17 MG/1
TABLET ORAL
Refills: 0 | Status: DISCONTINUED | COMMUNITY
End: 2023-09-20

## 2023-09-20 RX ORDER — LEVOTHYROXINE SODIUM 0.05 MG/1
50 TABLET ORAL DAILY
Refills: 0 | Status: ACTIVE | COMMUNITY

## 2023-09-27 NOTE — REASON FOR VISIT
Subjective  Chief Complaint   Patient presents with    sinus pressure    sinus congestion    snezzing    Hoarse     This patient is new to this provider. HPI:  Vargas Fraser is a 39 y.o. female with medical problems as listed below who presents for URI symptoms. Sinus pressure/congestion/sneezing: Night before last had a tickle inside her nose. Yesterday morning she had stopped up nosed. Headache. Hoarse voice, sneezing. No appetite. No fever, chills, body aches. Granddaughter just started school and has slight runny nose. Has not been taking any OTC medications. Patient Active Problem List   Diagnosis    Severe obesity (HCC)    IIH (idiopathic intracranial hypertension)    Headache    HSV-1 infection    Migraines    Alopecia of scalp     Family History   Problem Relation Age of Onset    Ovarian Cancer Sister     Ovarian Cancer Mother     Migraines Sister     Migraines Mother       Social History     Tobacco Use    Smoking status: Former     Packs/day: 1     Types: Cigarettes     Quit date: 2019     Years since quittin.8    Smokeless tobacco: Never   Substance Use Topics    Alcohol use: Yes    Drug use: Never     Current Outpatient Medications on File Prior to Visit   Medication Sig Dispense Refill    valACYclovir (VALTREX) 500 MG tablet Take 1 tablet by mouth daily      albuterol sulfate HFA (PROVENTIL;VENTOLIN;PROAIR) 108 (90 Base) MCG/ACT inhaler       topiramate (TOPAMAX) 100 MG tablet Take 0.5 tablets by mouth 2 times daily      Multiple Vitamins-Minerals (MULTIVITAMIN WITH MINERALS) tablet Take 1 tablet by mouth daily 30 tablet 3     No current facility-administered medications on file prior to visit. No Known Allergies  Review of Systems   Constitutional: Negative. HENT:  Positive for congestion and sneezing. Neurological:  Positive for headaches.          Objective  Vitals:    23 1051   BP: 134/85   Pulse:    Resp:    Temp:    SpO2:      Physical Exam  Constitutional: [Atrial Fibrillation] : atrial fibrillation [Follow-Up - Clinic] : a clinic follow-up of [Dyspnea] : dyspnea [Coronary Artery Disease] : coronary artery disease [Hypertension] : hypertension [Supraventricular Tachycardia] : supraventricular tachycardia [FreeTextEntry1] : 74-year-old female comes in for follow-up consultation.  Since hiatus hernia surgery she still has continued dyspnea on exertion. A mild to moderate level of workload. No PND, orthopnea, pedal edema. No cough, fever, or chills. Often related to anxiety. It is nonprogressive.  Her anxiety has significantly worsened in relation to COVID-19 pandemic.\par She was recently admitted to [facility] for febrile illness of unclear etiology treated with antibiotics.  No recurrence since then.\par She denies any , palpitation, dizziness, or syncopal episode.\par Her anxiety has significantly improved\par No bleeding complications. \par

## 2023-10-19 ENCOUNTER — APPOINTMENT (OUTPATIENT)
Dept: CARDIOLOGY | Facility: CLINIC | Age: 78
End: 2023-10-19
Payer: MEDICARE

## 2023-10-19 ENCOUNTER — NON-APPOINTMENT (OUTPATIENT)
Age: 78
End: 2023-10-19

## 2023-10-19 PROCEDURE — G2066: CPT | Mod: NC

## 2023-10-19 PROCEDURE — 93298 REM INTERROG DEV EVAL SCRMS: CPT | Mod: NC

## 2023-11-21 ENCOUNTER — NON-APPOINTMENT (OUTPATIENT)
Age: 78
End: 2023-11-21

## 2023-11-21 ENCOUNTER — APPOINTMENT (OUTPATIENT)
Dept: CARDIOLOGY | Facility: CLINIC | Age: 78
End: 2023-11-21
Payer: MEDICARE

## 2023-11-22 PROCEDURE — G2066: CPT | Mod: NC

## 2023-11-22 PROCEDURE — 93298 REM INTERROG DEV EVAL SCRMS: CPT

## 2023-12-22 ENCOUNTER — NON-APPOINTMENT (OUTPATIENT)
Age: 78
End: 2023-12-22

## 2023-12-22 ENCOUNTER — APPOINTMENT (OUTPATIENT)
Dept: CARDIOLOGY | Facility: CLINIC | Age: 78
End: 2023-12-22
Payer: MEDICARE

## 2023-12-23 PROCEDURE — 93298 REM INTERROG DEV EVAL SCRMS: CPT

## 2023-12-23 PROCEDURE — G2066: CPT

## 2024-01-08 ENCOUNTER — APPOINTMENT (OUTPATIENT)
Dept: ORTHOPEDIC SURGERY | Facility: CLINIC | Age: 79
End: 2024-01-08
Payer: MEDICARE

## 2024-01-08 PROCEDURE — 99214 OFFICE O/P EST MOD 30 MIN: CPT

## 2024-01-08 RX ORDER — METHYLPREDNISOLONE 4 MG/1
4 TABLET ORAL
Qty: 1 | Refills: 0 | Status: ACTIVE | COMMUNITY
Start: 2024-01-08 | End: 1900-01-01

## 2024-01-08 NOTE — HISTORY OF PRESENT ILLNESS
[de-identified] : Follow up lumbar spine. Patient states she had a left hip injection 1 month ago with relief for 3 weeks. Patient states she woke up 3 days ago with severe left hip pain. Patient states she was in excruciating pain. Patient states she feels increased pain with certain movements. Patient admits to taking Meloxicam for pain with no relief.  Today's Pain 6.5/10

## 2024-01-08 NOTE — ASSESSMENT
[FreeTextEntry1] : 79 yo female presents today for follow up on her lumbar spine. Patient has persistent symptoms in her low back and SI Joint suggestive of nerve involvement. Given that patient has had previous lumbar surgery, multiple injections, medication therapy, and physical therapy with little relief in symptoms, recommend MRI to further evaluate for extent of pathology and to evaluate status of previous lumbar surgery.  - Patient given prescription for MRI, follow up after study is completed to discuss results.   - Patient will begin Medrol.  Patient advised not to take any NSAIDs while taking the steroids.   - Recommend physical therapy to regain range of motion, strengthening and symptomatic improvement. Prescription given in office today.   - Recommend NSAIDs PRN - Recommend heating pad use to decrease muscle spasm - Discussed the importance of home exercises, including but not limited to hamstring stretching and core strengthening   Patient was educated on their diagnosis today. All questions answered and patient expressed understanding.  Follow up after MRI

## 2024-01-08 NOTE — PHYSICAL EXAM
[Right] : right hip [Left] : left hip [NL (45)] : external rotation 45 degrees [de-identified] : external rotation 30 degrees [TWNoteComboBox6] : internal rotation 30 degrees [TextEntry] : Constitutional: Well groomed and developed.  Respiratory: Normal, unlabored breathing. No use of accessory muscles.  Skin: No rashes or ulcers. Skin warm and dry.  Psychiatric: Oriented to time, place, person and event. No acute distress. Gait: Heel to toe Patient able to walk on toes and heels.    Lumbar spine:  Posture: Normal on coronal and sagittal alignment  AROM:  Flexion 60  Extension 10  Moderate pain with simulated truncal motion   Tenderness:  Thoracic: No tenderness on palpation  Lumbar: Moderate tenderness on palpation  Sacrum/coccyx: no tenderness on palpation  Greater trochanteric bursa:  No tenderness  PSIS: None    Motor:                         R             L LE:                                IS                    5             5 Quad              5             5 TA                  5             5 EHL                5             5 Gastroc         5             5                 R  L DTR: Patella  2+  2+ Achilles  2+  2+  Sensory: Light touch sensation intact T12-S1  Babinski's Sign: Negative bilaterally  Straight leg raise test: Negative bilaterally  JANINA test: negative bilaterally

## 2024-01-26 ENCOUNTER — APPOINTMENT (OUTPATIENT)
Dept: CARDIOLOGY | Facility: CLINIC | Age: 79
End: 2024-01-26
Payer: MEDICARE

## 2024-01-26 ENCOUNTER — NON-APPOINTMENT (OUTPATIENT)
Age: 79
End: 2024-01-26

## 2024-01-26 PROCEDURE — 93298 REM INTERROG DEV EVAL SCRMS: CPT

## 2024-02-05 ENCOUNTER — APPOINTMENT (OUTPATIENT)
Dept: ORTHOPEDIC SURGERY | Facility: CLINIC | Age: 79
End: 2024-02-05
Payer: MEDICARE

## 2024-02-05 DIAGNOSIS — M16.11 UNILATERAL PRIMARY OSTEOARTHRITIS, RIGHT HIP: ICD-10-CM

## 2024-02-05 DIAGNOSIS — Z98.1 ARTHRODESIS STATUS: ICD-10-CM

## 2024-02-05 DIAGNOSIS — M40.30 FLATBACK SYNDROME, SITE UNSPECIFIED: ICD-10-CM

## 2024-02-05 DIAGNOSIS — M54.16 RADICULOPATHY, LUMBAR REGION: ICD-10-CM

## 2024-02-05 DIAGNOSIS — M48.061 SPINAL STENOSIS, LUMBAR REGION WITHOUT NEUROGENIC CLAUDICATION: ICD-10-CM

## 2024-02-05 DIAGNOSIS — M41.25 OTHER IDIOPATHIC SCOLIOSIS, THORACOLUMBAR REGION: ICD-10-CM

## 2024-02-05 DIAGNOSIS — M16.12 UNILATERAL PRIMARY OSTEOARTHRITIS, LEFT HIP: ICD-10-CM

## 2024-02-05 DIAGNOSIS — M47.817 SPONDYLOSIS W/OUT MYELOPATHY OR RADICULOPATHY, LUMBOSACRAL REGION: ICD-10-CM

## 2024-02-05 PROCEDURE — 99214 OFFICE O/P EST MOD 30 MIN: CPT

## 2024-02-05 NOTE — DATA REVIEWED
[MRI] : MRI [Lumbar Spine] : lumbar spine [I independently reviewed and interpreted images and report] : I independently reviewed and interpreted images and report [FreeTextEntry1] : 1/2024 MRI of L-Spine was reviewed today and is as follows:  Severe scoliosis  DDD L3-4 Severe stenosis L3-4

## 2024-02-05 NOTE — PHYSICAL EXAM
[Right] : right hip [Left] : left hip [NL (45)] : external rotation 45 degrees [de-identified] : external rotation 30 degrees [TWNoteComboBox6] : internal rotation 30 degrees [TextEntry] : Constitutional: Well groomed and developed.  Respiratory: Normal, unlabored breathing. No use of accessory muscles.  Skin: No rashes or ulcers. Skin warm and dry.  Psychiatric: Oriented to time, place, person and event. No acute distress. Gait: Heel to toe Patient able to walk on toes and heels.    Lumbar spine:  Posture: Normal on coronal and sagittal alignment  AROM:  Flexion 60  Extension 10  Moderate pain with simulated truncal motion   Tenderness:  Thoracic: No tenderness on palpation  Lumbar: Moderate tenderness on palpation  Sacrum/coccyx: no tenderness on palpation  Greater trochanteric bursa:  No tenderness  PSIS: None    Motor:                         R             L LE:                                IS                    5             5 Quad              5             5 TA                  5             5 EHL                5             5 Gastroc         5             5                 R  L DTR: Patella  2+  2+ Achilles  2+  2+  Sensory: Light touch sensation intact T12-S1  Babinski's Sign: Negative bilaterally  Straight leg raise test: Negative bilaterally  JANINA test: negative bilaterally

## 2024-02-05 NOTE — ASSESSMENT
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 MRI detailed above shows severe scoliosis with severe stenosis. Discussed with patient she may benefit from NOHELIA with pain management as well as follow up with PT.   - Referral to pain management for lumbar NOEHLIA, follow up 2 weeks after injection.   - Recommend physical therapy to regain range of motion, strengthening and symptomatic improvement. Prescription given in office today.   - Recommend NSAIDs PRN - Recommend heating pad use to decrease muscle spasm - Discussed the importance of home exercises, including but not limited to hamstring stretching and core strengthening   Patient was educated on their diagnosis today. All questions answered and patient expressed understanding.  Upon review of patient's MRI, there is an incidental finding of mass/cyst of the L kidney.  Patient was thoroughly counseled and advised to follow up with his PCP for further evaluation and treatment.  I discussed with the patient that there is a potential for this finding to be malignant, which may require further treatment by oncology. Patient verbalized understanding and will follow up.   Follow up in 2 months

## 2024-02-05 NOTE — HISTORY OF PRESENT ILLNESS
[de-identified] : Follow up lumbar spine MRI Results at [facility]. Patient state she has pain radiating into bilateral hips, groin, and down to the knees. Patient states she has difficulty sleeping. Patient admits to finishing Medrol pack with short term relief. Patient admits to taking Meloxicam PRN for pain.  Today's Pain 7/10

## 2024-03-01 ENCOUNTER — NON-APPOINTMENT (OUTPATIENT)
Age: 79
End: 2024-03-01

## 2024-03-01 ENCOUNTER — APPOINTMENT (OUTPATIENT)
Dept: CARDIOLOGY | Facility: CLINIC | Age: 79
End: 2024-03-01
Payer: MEDICARE

## 2024-03-01 PROCEDURE — 93298 REM INTERROG DEV EVAL SCRMS: CPT

## 2024-03-20 ENCOUNTER — APPOINTMENT (OUTPATIENT)
Dept: CARDIOLOGY | Facility: CLINIC | Age: 79
End: 2024-03-20
Payer: MEDICARE

## 2024-03-20 VITALS
WEIGHT: 148 LBS | DIASTOLIC BLOOD PRESSURE: 74 MMHG | OXYGEN SATURATION: 96 % | BODY MASS INDEX: 27.23 KG/M2 | SYSTOLIC BLOOD PRESSURE: 158 MMHG | HEIGHT: 62 IN | HEART RATE: 80 BPM

## 2024-03-20 DIAGNOSIS — E78.2 MIXED HYPERLIPIDEMIA: ICD-10-CM

## 2024-03-20 DIAGNOSIS — G62.9 POLYNEUROPATHY, UNSPECIFIED: ICD-10-CM

## 2024-03-20 DIAGNOSIS — I47.10 SUPRAVENTRICULAR TACHYCARDIA, UNSPECIFIED: ICD-10-CM

## 2024-03-20 DIAGNOSIS — I25.41 CORONARY ARTERY ANEURYSM: ICD-10-CM

## 2024-03-20 DIAGNOSIS — I48.0 PAROXYSMAL ATRIAL FIBRILLATION: ICD-10-CM

## 2024-03-20 DIAGNOSIS — I10 ESSENTIAL (PRIMARY) HYPERTENSION: ICD-10-CM

## 2024-03-20 DIAGNOSIS — I34.0 NONRHEUMATIC MITRAL (VALVE) INSUFFICIENCY: ICD-10-CM

## 2024-03-20 DIAGNOSIS — I70.90 UNSPECIFIED ATHEROSCLEROSIS: ICD-10-CM

## 2024-03-20 PROCEDURE — 99214 OFFICE O/P EST MOD 30 MIN: CPT

## 2024-03-20 PROCEDURE — G2211 COMPLEX E/M VISIT ADD ON: CPT

## 2024-03-20 RX ORDER — DILTIAZEM HYDROCHLORIDE 120 MG/1
120 CAPSULE, EXTENDED RELEASE ORAL
Qty: 90 | Refills: 3 | Status: ACTIVE | COMMUNITY
Start: 1900-01-01 | End: 1900-01-01

## 2024-03-20 RX ORDER — ATORVASTATIN CALCIUM 80 MG/1
80 TABLET, FILM COATED ORAL
Qty: 90 | Refills: 3 | Status: ACTIVE | COMMUNITY
Start: 1900-01-01 | End: 1900-01-01

## 2024-03-20 RX ORDER — EZETIMIBE 10 MG/1
10 TABLET ORAL
Qty: 90 | Refills: 1 | Status: ACTIVE | COMMUNITY
Start: 2024-03-20 | End: 1900-01-01

## 2024-03-20 RX ORDER — GABAPENTIN 100 MG/1
100 CAPSULE ORAL
Qty: 90 | Refills: 3 | Status: ACTIVE | COMMUNITY
Start: 2024-03-20 | End: 1900-01-01

## 2024-03-20 NOTE — REASON FOR VISIT
[Spouse] : spouse [FreeTextEntry3] : Dr. Madison Santiago [FreeTextEntry1] : 78-year-old female comes in for follow-up consultation with her .  Main complaint is significant right lower extremity radiculopathy not amenable to epidurals and trigger point injections.  Was recommended to start gabapentin and see further for pain management. I also reviewed her labs which were done recently.  Overall her shortness of breath has remained stable..  She has not had any significant worsening palpitation, dizziness lightheadedness or near syncopal or syncopal event. No near syncopal or syncopal event. she still has continued dyspnea on exertion. A mild to moderate level of workload.  No complaint of chest pain.  No PND, orthopnea, pedal edema. No cough, fever, or chills. Often related to anxiety. It is nonprogressive.  She is on inhaler therapy She has also significant continued fatigue and tiredness.  Recent labs showed possible Lyme disease Her anxiety has significantly improved No bleeding complications.

## 2024-03-20 NOTE — ASSESSMENT
Pt c/o 2 days of midsternal chest pressure, worse when laying down, and states he feels like there's fluid collecting in his chest. Denies SOB. Denies PMH [FreeTextEntry1] : Reviewed on September 20, 2023. Recent labs from September 7, 2023 normal CBC HDL 66  triglycerides 83 EKG as noted above  Reviewed on March 29, 2023. Most recent labs her TSH about 6. Echocardiogram from last year reviewed. Implantable loop recorder data reviewed.  Reviewed on March 20, 2024. Recent labs from March 2024 CMP stable HDL 61 . Implantable loop recorder data reviewed which were done recently.

## 2024-03-20 NOTE — CARDIOLOGY SUMMARY
[No Ischemia] : no Ischemia [No Exercise Ind Arr] : no exercise induced arrhythmias [No Symptoms] : no Symptoms [___] : [unfilled] [LVEF ___%] : LVEF [unfilled]% [Mild] : mild LV dysfunction [None] : no pulmonary hypertension [Normal] : normal LA size [de-identified] : 5/17/22 nsr October 31, 2022 normal sinus rhythm possible old inferior infarct nonspecific T wave changes low voltage September 20, 2023 normal sinus rhythm possible old inferior infarct low voltage poor R wave progression [de-identified] : September 17, 2020 EF 60 to 65% mild mitral regurgitation normal left atrium\par  February 21, 2022 EF 55 to 60% mild mitral regurgitation normal left atrium normal pulmonary pressures. [de-identified] : Loop recorder information reviewed.

## 2024-03-20 NOTE — PHYSICAL EXAM
[Normal] : well developed, well nourished, no acute distress [Normal S1, S2] : normal S1, S2 [No Rub] : no rub [No Gallop] : no gallop [Murmur] : murmur [Clear Lung Fields] : clear lung fields [Abnormal Gait] : abnormal gait [No Edema] : no edema [Normal Radial B/L] : normal radial B/L [Normal Speech] : normal speech [Alert and Oriented] : alert and oriented

## 2024-04-04 ENCOUNTER — NON-APPOINTMENT (OUTPATIENT)
Age: 79
End: 2024-04-04

## 2024-04-05 ENCOUNTER — APPOINTMENT (OUTPATIENT)
Dept: CARDIOLOGY | Facility: CLINIC | Age: 79
End: 2024-04-05
Payer: MEDICARE

## 2024-04-05 PROCEDURE — 93298 REM INTERROG DEV EVAL SCRMS: CPT

## 2024-05-09 ENCOUNTER — NON-APPOINTMENT (OUTPATIENT)
Age: 79
End: 2024-05-09

## 2024-05-10 ENCOUNTER — APPOINTMENT (OUTPATIENT)
Dept: CARDIOLOGY | Facility: CLINIC | Age: 79
End: 2024-05-10
Payer: MEDICARE

## 2024-05-10 PROCEDURE — 93298 REM INTERROG DEV EVAL SCRMS: CPT

## 2024-06-13 ENCOUNTER — NON-APPOINTMENT (OUTPATIENT)
Age: 79
End: 2024-06-13

## 2024-06-14 ENCOUNTER — APPOINTMENT (OUTPATIENT)
Dept: CARDIOLOGY | Facility: CLINIC | Age: 79
End: 2024-06-14
Payer: MEDICARE

## 2024-06-14 PROCEDURE — 93298 REM INTERROG DEV EVAL SCRMS: CPT

## 2024-07-19 ENCOUNTER — APPOINTMENT (OUTPATIENT)
Dept: CARDIOLOGY | Facility: CLINIC | Age: 79
End: 2024-07-19
Payer: MEDICARE

## 2024-07-19 ENCOUNTER — NON-APPOINTMENT (OUTPATIENT)
Age: 79
End: 2024-07-19

## 2024-07-19 PROCEDURE — 93298 REM INTERROG DEV EVAL SCRMS: CPT

## 2024-08-15 ENCOUNTER — APPOINTMENT (OUTPATIENT)
Dept: OPHTHALMOLOGY | Facility: CLINIC | Age: 79
End: 2024-08-15

## 2024-08-22 ENCOUNTER — NON-APPOINTMENT (OUTPATIENT)
Age: 79
End: 2024-08-22

## 2024-08-23 ENCOUNTER — APPOINTMENT (OUTPATIENT)
Dept: CARDIOLOGY | Facility: CLINIC | Age: 79
End: 2024-08-23
Payer: MEDICARE

## 2024-08-23 PROCEDURE — 93298 REM INTERROG DEV EVAL SCRMS: CPT

## 2024-09-25 ENCOUNTER — APPOINTMENT (OUTPATIENT)
Dept: CARDIOLOGY | Facility: CLINIC | Age: 79
End: 2024-09-25
Payer: MEDICARE

## 2024-09-25 ENCOUNTER — NON-APPOINTMENT (OUTPATIENT)
Age: 79
End: 2024-09-25

## 2024-09-25 VITALS
HEART RATE: 73 BPM | WEIGHT: 142 LBS | OXYGEN SATURATION: 94 % | SYSTOLIC BLOOD PRESSURE: 128 MMHG | HEIGHT: 62 IN | BODY MASS INDEX: 26.13 KG/M2 | DIASTOLIC BLOOD PRESSURE: 74 MMHG

## 2024-09-25 DIAGNOSIS — I47.19 OTHER SUPRAVENTRICULAR TACHYCARDIA: ICD-10-CM

## 2024-09-25 DIAGNOSIS — I25.41 CORONARY ARTERY ANEURYSM: ICD-10-CM

## 2024-09-25 DIAGNOSIS — E78.2 MIXED HYPERLIPIDEMIA: ICD-10-CM

## 2024-09-25 DIAGNOSIS — I77.810 THORACIC AORTIC ECTASIA: ICD-10-CM

## 2024-09-25 DIAGNOSIS — I10 ESSENTIAL (PRIMARY) HYPERTENSION: ICD-10-CM

## 2024-09-25 PROCEDURE — G2211 COMPLEX E/M VISIT ADD ON: CPT

## 2024-09-25 PROCEDURE — 99214 OFFICE O/P EST MOD 30 MIN: CPT

## 2024-09-25 PROCEDURE — 93000 ELECTROCARDIOGRAM COMPLETE: CPT

## 2024-09-25 PROCEDURE — 93306 TTE W/DOPPLER COMPLETE: CPT

## 2024-09-25 NOTE — PHYSICAL EXAM
[Well Developed] : well developed [Normal Venous Pressure] : normal venous pressure [Carotid Bruit] : carotid bruit [Normal S1, S2] : normal S1, S2 [Murmur] : murmur [Clear Lung Fields] : clear lung fields [Abnormal Gait] : abnormal gait [No Edema] : no edema [de-identified] : kyphoscoliosis

## 2024-09-25 NOTE — CARDIOLOGY SUMMARY
[No Ischemia] : no Ischemia [No Exercise Ind Arr] : no exercise induced arrhythmias [No Symptoms] : no Symptoms [LVEF ___%] : LVEF [unfilled]% [___] : [unfilled] [Mild] : mild LV dysfunction [None] : no pulmonary hypertension [Normal] : normal LA size [de-identified] : 5/17/22 nsr October 31, 2022 normal sinus rhythm possible old inferior infarct nonspecific T wave changes low voltage September 20, 2023 normal sinus rhythm possible old inferior infarct low voltage poor R wave progression September 25, 2024.  Normal sinus rhythm. [de-identified] : September 17, 2020 EF 60 to 65% mild mitral regurgitation normal left atrium February 21, 2022 EF 55 to 60% mild mitral regurgitation normal left atrium normal pulmonary pressures. September 25, 2024.  Preserved EF.  Ascending aortic dimension at 4.3 cm.  Fibrocalcific aortic and mitral valve.  Normal pulmonary pressures. [de-identified] : Loop recorder information reviewed. episode of PSVT lastinf for 3-4 minutes

## 2024-09-25 NOTE — PHYSICAL EXAM
[Well Developed] : well developed [Normal Venous Pressure] : normal venous pressure [Carotid Bruit] : carotid bruit [Normal S1, S2] : normal S1, S2 [Murmur] : murmur [Clear Lung Fields] : clear lung fields [Abnormal Gait] : abnormal gait [No Edema] : no edema [de-identified] : kyphoscoliosis

## 2024-09-25 NOTE — CARDIOLOGY SUMMARY
[No Ischemia] : no Ischemia [No Exercise Ind Arr] : no exercise induced arrhythmias [No Symptoms] : no Symptoms [LVEF ___%] : LVEF [unfilled]% [___] : [unfilled] [Mild] : mild LV dysfunction [None] : no pulmonary hypertension [Normal] : normal LA size [de-identified] : 5/17/22 nsr October 31, 2022 normal sinus rhythm possible old inferior infarct nonspecific T wave changes low voltage September 20, 2023 normal sinus rhythm possible old inferior infarct low voltage poor R wave progression September 25, 2024.  Normal sinus rhythm. [de-identified] : September 17, 2020 EF 60 to 65% mild mitral regurgitation normal left atrium February 21, 2022 EF 55 to 60% mild mitral regurgitation normal left atrium normal pulmonary pressures. September 25, 2024.  Preserved EF.  Ascending aortic dimension at 4.3 cm.  Fibrocalcific aortic and mitral valve.  Normal pulmonary pressures. [de-identified] : Loop recorder information reviewed. episode of PSVT lastinf for 3-4 minutes

## 2024-09-25 NOTE — ASSESSMENT
[FreeTextEntry1] : Reviewed on September 20, 2023. Recent labs from September 7, 2023 normal CBC HDL 66  triglycerides 83 EKG as noted above  Reviewed on March 29, 2023. Most recent labs her TSH about 6. Echocardiogram from last year reviewed. Implantable loop recorder data reviewed.  Reviewed on March 20, 2024. Recent labs from March 2024 CMP stable HDL 61 . Implantable loop recorder data reviewed which were done recently.  reviewed on September 25, 2024. Labs were reviewed stable findings discussed. Echocardiogram reviewed. Implantable loop recorder data reviewed.

## 2024-09-25 NOTE — DISCUSSION/SUMMARY
[Patient] : the patient [Risks] : risks [Benefits] : benefits [Alternatives] : alternatives [FreeTextEntry1] : 78-year-old female with above medical history active medical problems as noted below   AVNRT status post ablation, and presence of paroxysmal SVT or paroxysmal atrial fibrillation, status post implantable loop recorder without any significant complications or evidence of atrial fibrillation.  Brief episodes of PSVT noted associated with symptoms.  Will increase diltiazem to 180 mg daily.  Being followed regularly in the office.  Not on any anticoagulation as per discussion with electrophysiologist.  No atrial fibrillation noted so far on loop recorder.  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 2019 nonischemic perfusion scan. echocardiogram had shown an ejection fraction 65% ascending aorta was 3.4 cm, mitral insufficiency.  Continue statin  and zetia therapy. Continue Cardizem at a lower dose Aspirin 81 mg to be taken every other day due to increased ecchymosis and easy bruising. After taking it daily. It is important in presence of coronary aneurysm. Continue diltiazem.   Hypertension, blood pressure controlled. Continue current medication. Low sodium diet.  Prescription done Goal less than 130/80.  Clinically no CHF.  No CKD.  Non-smoker.  Hyperlipidemia, atorvastatin to 80 mg.  Try to aim LDL less than 70.  Risk benefits alternatives reviewed.  Lifestyle and risk factor modification.  Not achieved.  Zetia 10 mg added.  Lipid panel continue to shows LDL cholesterol of greater than 70.  Will continue to follow.  If in future remains persistently elevated in spite of max dose of Zetia and statins we can discuss further regarding PCSK9 inhibitors.  Shortness of breath,Pulmonary nodules.  Normal BNP level. She has severe kyphoscoliosis probably the reason with restrictive lung disease.  Continue follow-up with pulmonology  Counseling regarding low saturated fat, salt and carbohydrate intake was reviewed. Active lifestyle and regular. Exercise along with weight management is advised. All the above were at length reviewed. Answered all the questions. Thank you very much for this kind referral. Please do not hesitate to give me a call for any question. Part of this transcription was done with voice recognition software and phonetically similar errors are common. I apologize for that. Please donot hesitate to call for any questions due to above.  Sincerely, Carmen Santiago MD,FAC,JONN  [EKG obtained to assist in diagnosis and management of assessed problem(s)] : EKG obtained to assist in diagnosis and management of assessed problem(s)

## 2024-09-25 NOTE — REASON FOR VISIT
[Spouse] : spouse [FreeTextEntry3] : Dr. Madison Santiago [FreeTextEntry1] : 78-year-old is here with her .  1 episode of significant palpitations with associated mild dizziness few days ago.  No associated chest pain. overall her shortness of breath has remained stable..  She has not had any significant worsening palpitation, dizziness lightheadedness or near syncopal or syncopal event. No near syncopal or syncopal event. she still has continued dyspnea on exertion. A mild to moderate level of workload.  No complaint of chest pain.  No PND, orthopnea, pedal edema. No cough, fever, or chills. Often related to anxiety. It is nonprogressive.  She is on inhaler therapy She has also significant continued fatigue and tiredness.  Recent labs showed possible Lyme disease Her anxiety has significantly improved No bleeding complications.

## 2024-09-26 ENCOUNTER — NON-APPOINTMENT (OUTPATIENT)
Age: 79
End: 2024-09-26

## 2024-09-27 ENCOUNTER — APPOINTMENT (OUTPATIENT)
Dept: CARDIOLOGY | Facility: CLINIC | Age: 79
End: 2024-09-27
Payer: MEDICARE

## 2024-09-27 PROCEDURE — 93298 REM INTERROG DEV EVAL SCRMS: CPT

## 2024-10-09 ENCOUNTER — APPOINTMENT (OUTPATIENT)
Dept: OPHTHALMOLOGY | Facility: CLINIC | Age: 79
End: 2024-10-09

## 2024-11-01 ENCOUNTER — APPOINTMENT (OUTPATIENT)
Dept: CARDIOLOGY | Facility: CLINIC | Age: 79
End: 2024-11-01

## 2024-11-01 ENCOUNTER — NON-APPOINTMENT (OUTPATIENT)
Age: 79
End: 2024-11-01

## 2024-11-01 PROCEDURE — 93298 REM INTERROG DEV EVAL SCRMS: CPT

## 2024-12-03 ENCOUNTER — NON-APPOINTMENT (OUTPATIENT)
Age: 79
End: 2024-12-03

## 2024-12-04 ENCOUNTER — APPOINTMENT (OUTPATIENT)
Dept: OPHTHALMOLOGY | Facility: CLINIC | Age: 79
End: 2024-12-04
Payer: MEDICARE

## 2024-12-04 ENCOUNTER — NON-APPOINTMENT (OUTPATIENT)
Age: 79
End: 2024-12-04

## 2024-12-04 PROCEDURE — 92134 CPTRZ OPH DX IMG PST SGM RTA: CPT

## 2024-12-04 PROCEDURE — 92014 COMPRE OPH EXAM EST PT 1/>: CPT

## 2024-12-06 ENCOUNTER — APPOINTMENT (OUTPATIENT)
Dept: CARDIOLOGY | Facility: CLINIC | Age: 79
End: 2024-12-06

## 2024-12-06 PROCEDURE — 93298 REM INTERROG DEV EVAL SCRMS: CPT

## 2024-12-09 ENCOUNTER — NON-APPOINTMENT (OUTPATIENT)
Age: 79
End: 2024-12-09

## 2024-12-09 ENCOUNTER — APPOINTMENT (OUTPATIENT)
Dept: CARDIOLOGY | Facility: CLINIC | Age: 79
End: 2024-12-09
Payer: MEDICARE

## 2024-12-09 VITALS
SYSTOLIC BLOOD PRESSURE: 128 MMHG | BODY MASS INDEX: 27.23 KG/M2 | OXYGEN SATURATION: 95 % | DIASTOLIC BLOOD PRESSURE: 60 MMHG | HEART RATE: 73 BPM | WEIGHT: 148 LBS | HEIGHT: 62 IN

## 2024-12-09 DIAGNOSIS — I47.10 SUPRAVENTRICULAR TACHYCARDIA, UNSPECIFIED: ICD-10-CM

## 2024-12-09 DIAGNOSIS — E78.2 MIXED HYPERLIPIDEMIA: ICD-10-CM

## 2024-12-09 DIAGNOSIS — R00.2 PALPITATIONS: ICD-10-CM

## 2024-12-09 DIAGNOSIS — I10 ESSENTIAL (PRIMARY) HYPERTENSION: ICD-10-CM

## 2024-12-09 DIAGNOSIS — I25.41 CORONARY ARTERY ANEURYSM: ICD-10-CM

## 2024-12-09 PROCEDURE — 93000 ELECTROCARDIOGRAM COMPLETE: CPT

## 2024-12-09 PROCEDURE — G2211 COMPLEX E/M VISIT ADD ON: CPT

## 2024-12-09 PROCEDURE — 99214 OFFICE O/P EST MOD 30 MIN: CPT

## 2024-12-12 ENCOUNTER — APPOINTMENT (OUTPATIENT)
Dept: CARDIOLOGY | Facility: CLINIC | Age: 79
End: 2024-12-12

## 2025-01-10 ENCOUNTER — NON-APPOINTMENT (OUTPATIENT)
Age: 80
End: 2025-01-10

## 2025-01-10 ENCOUNTER — APPOINTMENT (OUTPATIENT)
Dept: CARDIOLOGY | Facility: CLINIC | Age: 80
End: 2025-01-10
Payer: MEDICARE

## 2025-01-10 PROCEDURE — 93298 REM INTERROG DEV EVAL SCRMS: CPT

## 2025-01-16 ENCOUNTER — APPOINTMENT (OUTPATIENT)
Dept: CARDIOLOGY | Facility: CLINIC | Age: 80
End: 2025-01-16
Payer: MEDICARE

## 2025-01-16 VITALS
HEART RATE: 80 BPM | OXYGEN SATURATION: 97 % | DIASTOLIC BLOOD PRESSURE: 84 MMHG | HEIGHT: 62 IN | WEIGHT: 144 LBS | SYSTOLIC BLOOD PRESSURE: 132 MMHG | BODY MASS INDEX: 26.5 KG/M2

## 2025-01-16 DIAGNOSIS — I10 ESSENTIAL (PRIMARY) HYPERTENSION: ICD-10-CM

## 2025-01-16 DIAGNOSIS — I47.10 SUPRAVENTRICULAR TACHYCARDIA, UNSPECIFIED: ICD-10-CM

## 2025-01-16 DIAGNOSIS — M41.9 SCOLIOSIS, UNSPECIFIED: ICD-10-CM

## 2025-01-16 DIAGNOSIS — Z87.19 PERSONAL HISTORY OF OTHER DISEASES OF THE DIGESTIVE SYSTEM: ICD-10-CM

## 2025-01-16 DIAGNOSIS — I25.41 CORONARY ARTERY ANEURYSM: ICD-10-CM

## 2025-01-16 DIAGNOSIS — I48.0 PAROXYSMAL ATRIAL FIBRILLATION: ICD-10-CM

## 2025-01-16 DIAGNOSIS — I70.90 UNSPECIFIED ATHEROSCLEROSIS: ICD-10-CM

## 2025-01-16 DIAGNOSIS — E78.2 MIXED HYPERLIPIDEMIA: ICD-10-CM

## 2025-01-16 DIAGNOSIS — E03.9 HYPOTHYROIDISM, UNSPECIFIED: ICD-10-CM

## 2025-01-16 DIAGNOSIS — I34.0 NONRHEUMATIC MITRAL (VALVE) INSUFFICIENCY: ICD-10-CM

## 2025-01-16 DIAGNOSIS — Z91.018 ALLERGY TO OTHER FOODS: ICD-10-CM

## 2025-01-16 DIAGNOSIS — G47.30 SLEEP APNEA, UNSPECIFIED: ICD-10-CM

## 2025-01-16 DIAGNOSIS — I47.19 OTHER SUPRAVENTRICULAR TACHYCARDIA: ICD-10-CM

## 2025-01-16 DIAGNOSIS — Z86.19 PERSONAL HISTORY OF OTHER INFECTIOUS AND PARASITIC DISEASES: ICD-10-CM

## 2025-01-16 DIAGNOSIS — I77.810 THORACIC AORTIC ECTASIA: ICD-10-CM

## 2025-01-16 PROCEDURE — G2211 COMPLEX E/M VISIT ADD ON: CPT

## 2025-01-16 PROCEDURE — 99214 OFFICE O/P EST MOD 30 MIN: CPT

## 2025-01-16 RX ORDER — LOSARTAN POTASSIUM 25 MG/1
25 TABLET, FILM COATED ORAL DAILY
Qty: 90 | Refills: 3 | Status: ACTIVE | COMMUNITY
Start: 2025-01-16 | End: 1900-01-01

## 2025-01-30 ENCOUNTER — APPOINTMENT (OUTPATIENT)
Dept: CARDIOLOGY | Facility: CLINIC | Age: 80
End: 2025-01-30
Payer: MEDICARE

## 2025-01-30 VITALS
DIASTOLIC BLOOD PRESSURE: 70 MMHG | HEIGHT: 62 IN | WEIGHT: 145 LBS | HEART RATE: 81 BPM | SYSTOLIC BLOOD PRESSURE: 136 MMHG | BODY MASS INDEX: 26.68 KG/M2 | OXYGEN SATURATION: 93 %

## 2025-01-30 DIAGNOSIS — I70.90 UNSPECIFIED ATHEROSCLEROSIS: ICD-10-CM

## 2025-01-30 DIAGNOSIS — I10 ESSENTIAL (PRIMARY) HYPERTENSION: ICD-10-CM

## 2025-01-30 DIAGNOSIS — E03.9 HYPOTHYROIDISM, UNSPECIFIED: ICD-10-CM

## 2025-01-30 DIAGNOSIS — I48.0 PAROXYSMAL ATRIAL FIBRILLATION: ICD-10-CM

## 2025-01-30 DIAGNOSIS — Z87.19 PERSONAL HISTORY OF OTHER DISEASES OF THE DIGESTIVE SYSTEM: ICD-10-CM

## 2025-01-30 DIAGNOSIS — I25.41 CORONARY ARTERY ANEURYSM: ICD-10-CM

## 2025-01-30 DIAGNOSIS — I77.810 THORACIC AORTIC ECTASIA: ICD-10-CM

## 2025-01-30 DIAGNOSIS — I47.10 SUPRAVENTRICULAR TACHYCARDIA, UNSPECIFIED: ICD-10-CM

## 2025-01-30 DIAGNOSIS — I34.0 NONRHEUMATIC MITRAL (VALVE) INSUFFICIENCY: ICD-10-CM

## 2025-01-30 DIAGNOSIS — I47.19 OTHER SUPRAVENTRICULAR TACHYCARDIA: ICD-10-CM

## 2025-01-30 DIAGNOSIS — G47.30 SLEEP APNEA, UNSPECIFIED: ICD-10-CM

## 2025-01-30 DIAGNOSIS — Z86.19 PERSONAL HISTORY OF OTHER INFECTIOUS AND PARASITIC DISEASES: ICD-10-CM

## 2025-01-30 DIAGNOSIS — Z98.890 OTHER SPECIFIED POSTPROCEDURAL STATES: ICD-10-CM

## 2025-01-30 DIAGNOSIS — E78.2 MIXED HYPERLIPIDEMIA: ICD-10-CM

## 2025-01-30 PROCEDURE — 99214 OFFICE O/P EST MOD 30 MIN: CPT

## 2025-01-30 PROCEDURE — G2211 COMPLEX E/M VISIT ADD ON: CPT

## 2025-02-14 ENCOUNTER — NON-APPOINTMENT (OUTPATIENT)
Age: 80
End: 2025-02-14

## 2025-02-14 ENCOUNTER — APPOINTMENT (OUTPATIENT)
Dept: CARDIOLOGY | Facility: CLINIC | Age: 80
End: 2025-02-14
Payer: MEDICARE

## 2025-02-14 PROCEDURE — 93298 REM INTERROG DEV EVAL SCRMS: CPT

## 2025-03-21 ENCOUNTER — NON-APPOINTMENT (OUTPATIENT)
Age: 80
End: 2025-03-21

## 2025-03-21 ENCOUNTER — APPOINTMENT (OUTPATIENT)
Dept: CARDIOLOGY | Facility: CLINIC | Age: 80
End: 2025-03-21
Payer: MEDICARE

## 2025-03-21 PROCEDURE — 93298 REM INTERROG DEV EVAL SCRMS: CPT

## 2025-04-08 ENCOUNTER — APPOINTMENT (OUTPATIENT)
Dept: CARDIOLOGY | Facility: CLINIC | Age: 80
End: 2025-04-08
Payer: MEDICARE

## 2025-04-08 VITALS
WEIGHT: 147 LBS | HEART RATE: 90 BPM | OXYGEN SATURATION: 97 % | HEIGHT: 62 IN | SYSTOLIC BLOOD PRESSURE: 152 MMHG | BODY MASS INDEX: 27.05 KG/M2 | DIASTOLIC BLOOD PRESSURE: 72 MMHG

## 2025-04-08 DIAGNOSIS — E78.2 MIXED HYPERLIPIDEMIA: ICD-10-CM

## 2025-04-08 DIAGNOSIS — I10 ESSENTIAL (PRIMARY) HYPERTENSION: ICD-10-CM

## 2025-04-08 DIAGNOSIS — I25.41 CORONARY ARTERY ANEURYSM: ICD-10-CM

## 2025-04-08 DIAGNOSIS — I47.10 SUPRAVENTRICULAR TACHYCARDIA, UNSPECIFIED: ICD-10-CM

## 2025-04-08 DIAGNOSIS — I34.0 NONRHEUMATIC MITRAL (VALVE) INSUFFICIENCY: ICD-10-CM

## 2025-04-08 DIAGNOSIS — I48.0 PAROXYSMAL ATRIAL FIBRILLATION: ICD-10-CM

## 2025-04-08 PROCEDURE — G2211 COMPLEX E/M VISIT ADD ON: CPT

## 2025-04-08 PROCEDURE — 99214 OFFICE O/P EST MOD 30 MIN: CPT

## 2025-04-25 ENCOUNTER — APPOINTMENT (OUTPATIENT)
Dept: CARDIOLOGY | Facility: CLINIC | Age: 80
End: 2025-04-25
Payer: MEDICARE

## 2025-04-25 ENCOUNTER — NON-APPOINTMENT (OUTPATIENT)
Age: 80
End: 2025-04-25

## 2025-04-25 PROCEDURE — 93298 REM INTERROG DEV EVAL SCRMS: CPT

## 2025-04-29 ENCOUNTER — RESULT REVIEW (OUTPATIENT)
Age: 80
End: 2025-04-29

## 2025-05-06 ENCOUNTER — APPOINTMENT (OUTPATIENT)
Dept: CARDIOLOGY | Facility: CLINIC | Age: 80
End: 2025-05-06
Payer: MEDICARE

## 2025-05-06 VITALS
WEIGHT: 148 LBS | SYSTOLIC BLOOD PRESSURE: 134 MMHG | BODY MASS INDEX: 27.23 KG/M2 | HEIGHT: 62 IN | OXYGEN SATURATION: 98 % | DIASTOLIC BLOOD PRESSURE: 70 MMHG | HEART RATE: 85 BPM

## 2025-05-06 DIAGNOSIS — Z98.890 OTHER SPECIFIED POSTPROCEDURAL STATES: ICD-10-CM

## 2025-05-06 PROCEDURE — 93285 PRGRMG DEV EVAL SCRMS IP: CPT

## 2025-05-06 RX ORDER — CICLESONIDE 80 UG/1
80 AEROSOL, METERED RESPIRATORY (INHALATION)
Refills: 0 | Status: ACTIVE | COMMUNITY

## 2025-05-06 RX ORDER — ESTRADIOL 10 UG/1
10 TABLET, FILM COATED VAGINAL
Refills: 0 | Status: ACTIVE | COMMUNITY

## 2025-05-23 NOTE — ASSESSMENT
[FreeTextEntry1] : \par Labs March 20, 2018. CBC was stable. Creatinine 2.68 sodium 141, potassium 3.9, TSH 3.3. pro BNP 98.\par Echocardiogram 1/30/18 EF 60%\par Nuclear stress test 1/30/18 No evidence of ischemia\par \par Reviewed on May 16, 2019.\par Hospital information reviewed, which includes evaluation nodes. Labs chest x-ray, EKG. CT scan of the chest and abdomen ordered by gastroenterologist\par \par Reviewed on January 22, 2020\par Nuclear myocardial perfusion scan pharmacological June 11, 2019 was nonischemic\par Echocardiogram February 12, 2019 EF 65% mild mitral regurgitation.  Normal pulmonary pressures.\par Implantable loop recorder so far no significant atrial fibrillation episodes\par \par Reviewed on August 25, 2020.\par EKG August 25, 2020.  Indication history of paroxysmal atrial fibrillation PSVT.  Interpretation normal sinus rhythm low voltage complexes.\par Implantable loop recorder readings for the last few months reviewed.  No episode of atrial fibrillation.\par Labs from [facility] hospital admission requested\par \par Reviewed on February 7, 2022.\par Labs from November 2021 were reviewed.  Stable CBC except mild normocytic anemia, creatinine 0.8 potassium 4.1 sodium 147 LFT normal  HDL 75 triglycerides 73 N-terminal proBNP 125 Lyme titers overall negative\par Implantable loop recorder readings were discussed.\par \par 
High

## 2025-06-10 ENCOUNTER — NON-APPOINTMENT (OUTPATIENT)
Age: 80
End: 2025-06-10

## 2025-06-10 ENCOUNTER — APPOINTMENT (OUTPATIENT)
Dept: CARDIOLOGY | Facility: CLINIC | Age: 80
End: 2025-06-10
Payer: MEDICARE

## 2025-06-10 PROCEDURE — 93298 REM INTERROG DEV EVAL SCRMS: CPT

## 2025-07-15 ENCOUNTER — APPOINTMENT (OUTPATIENT)
Dept: CARDIOLOGY | Facility: CLINIC | Age: 80
End: 2025-07-15

## 2025-07-15 ENCOUNTER — NON-APPOINTMENT (OUTPATIENT)
Age: 80
End: 2025-07-15

## 2025-07-15 PROCEDURE — 93298 REM INTERROG DEV EVAL SCRMS: CPT

## 2025-08-19 ENCOUNTER — NON-APPOINTMENT (OUTPATIENT)
Age: 80
End: 2025-08-19

## 2025-08-19 ENCOUNTER — APPOINTMENT (OUTPATIENT)
Dept: CARDIOLOGY | Facility: CLINIC | Age: 80
End: 2025-08-19
Payer: MEDICARE

## 2025-08-19 PROCEDURE — 93298 REM INTERROG DEV EVAL SCRMS: CPT

## 2025-09-02 ENCOUNTER — RX RENEWAL (OUTPATIENT)
Age: 80
End: 2025-09-02